# Patient Record
Sex: FEMALE | Race: WHITE | URBAN - METROPOLITAN AREA
[De-identification: names, ages, dates, MRNs, and addresses within clinical notes are randomized per-mention and may not be internally consistent; named-entity substitution may affect disease eponyms.]

---

## 2017-02-01 ENCOUNTER — OFFICE VISIT (OUTPATIENT)
Dept: FAMILY MEDICINE CLINIC | Age: 82
End: 2017-02-01

## 2017-02-01 ENCOUNTER — HOSPITAL ENCOUNTER (OUTPATIENT)
Dept: LAB | Age: 82
Discharge: HOME | End: 2017-02-01
Payer: MEDICARE

## 2017-02-01 VITALS
BODY MASS INDEX: 34.41 KG/M2 | DIASTOLIC BLOOD PRESSURE: 70 MMHG | HEIGHT: 65 IN | TEMPERATURE: 97.7 F | RESPIRATION RATE: 20 BRPM | WEIGHT: 206.5 LBS | SYSTOLIC BLOOD PRESSURE: 150 MMHG | HEART RATE: 57 BPM

## 2017-02-01 DIAGNOSIS — S42.302S: ICD-10-CM

## 2017-02-01 DIAGNOSIS — R32 URINARY INCONTINENCE, UNSPECIFIED TYPE: ICD-10-CM

## 2017-02-01 DIAGNOSIS — E78.5 HYPERLIPIDEMIA, UNSPECIFIED HYPERLIPIDEMIA TYPE: ICD-10-CM

## 2017-02-01 DIAGNOSIS — I89.0 LYMPHEDEMA: ICD-10-CM

## 2017-02-01 DIAGNOSIS — I10 ESSENTIAL HYPERTENSION: ICD-10-CM

## 2017-02-01 DIAGNOSIS — M17.10 ARTHRITIS OF KNEE: ICD-10-CM

## 2017-02-01 DIAGNOSIS — E03.9 HYPOTHYROIDISM, UNSPECIFIED TYPE: ICD-10-CM

## 2017-02-01 DIAGNOSIS — Z51.81 MEDICATION MONITORING ENCOUNTER: ICD-10-CM

## 2017-02-01 DIAGNOSIS — I10 ESSENTIAL HYPERTENSION: Primary | ICD-10-CM

## 2017-02-01 DIAGNOSIS — R73.03 PREDIABETES: ICD-10-CM

## 2017-02-01 DIAGNOSIS — R73.01 ELEVATED FASTING BLOOD SUGAR: ICD-10-CM

## 2017-02-01 LAB
ALBUMIN SERPL BCP-MCNC: 3.5 G/DL (ref 3.4–5)
ALBUMIN/GLOB SERPL: 1.3 {RATIO} (ref 0.8–1.7)
ALP SERPL-CCNC: 86 U/L (ref 45–117)
ALT SERPL-CCNC: 14 U/L (ref 13–56)
ANION GAP BLD CALC-SCNC: 7 MMOL/L (ref 3–18)
AST SERPL W P-5'-P-CCNC: 12 U/L (ref 15–37)
BASOPHILS # BLD AUTO: 0 K/UL (ref 0–0.06)
BASOPHILS # BLD: 0 % (ref 0–2)
BILIRUB SERPL-MCNC: 0.5 MG/DL (ref 0.2–1)
BUN SERPL-MCNC: 26 MG/DL (ref 7–18)
BUN/CREAT SERPL: 21 (ref 12–20)
CALCIUM SERPL-MCNC: 8.9 MG/DL (ref 8.5–10.1)
CHLORIDE SERPL-SCNC: 107 MMOL/L (ref 100–108)
CO2 SERPL-SCNC: 26 MMOL/L (ref 21–32)
CREAT SERPL-MCNC: 1.21 MG/DL (ref 0.6–1.3)
DIFFERENTIAL METHOD BLD: ABNORMAL
EOSINOPHIL # BLD: 0.1 K/UL (ref 0–0.4)
EOSINOPHIL NFR BLD: 1 % (ref 0–5)
ERYTHROCYTE [DISTWIDTH] IN BLOOD BY AUTOMATED COUNT: 14.8 % (ref 11.6–14.5)
GLOBULIN SER CALC-MCNC: 2.8 G/DL (ref 2–4)
GLUCOSE POC: 141 MG/DL
GLUCOSE SERPL-MCNC: 104 MG/DL (ref 74–99)
HBA1C MFR BLD HPLC: 5.8 %
HCT VFR BLD AUTO: 39.8 % (ref 35–45)
HGB BLD-MCNC: 12.5 G/DL (ref 12–16)
LYMPHOCYTES # BLD AUTO: 25 % (ref 21–52)
LYMPHOCYTES # BLD: 1.8 K/UL (ref 0.9–3.6)
MCH RBC QN AUTO: 28 PG (ref 24–34)
MCHC RBC AUTO-ENTMCNC: 31.4 G/DL (ref 31–37)
MCV RBC AUTO: 89.2 FL (ref 74–97)
MONOCYTES # BLD: 0.4 K/UL (ref 0.05–1.2)
MONOCYTES NFR BLD AUTO: 6 % (ref 3–10)
NEUTS SEG # BLD: 5.1 K/UL (ref 1.8–8)
NEUTS SEG NFR BLD AUTO: 68 % (ref 40–73)
PLATELET # BLD AUTO: 219 K/UL (ref 135–420)
PMV BLD AUTO: 11.1 FL (ref 9.2–11.8)
POTASSIUM SERPL-SCNC: 4.6 MMOL/L (ref 3.5–5.5)
PROT SERPL-MCNC: 6.3 G/DL (ref 6.4–8.2)
RBC # BLD AUTO: 4.46 M/UL (ref 4.2–5.3)
SODIUM SERPL-SCNC: 140 MMOL/L (ref 136–145)
T4 FREE SERPL-MCNC: 1.3 NG/DL (ref 0.7–1.5)
TSH SERPL DL<=0.05 MIU/L-ACNC: 2.14 UIU/ML (ref 0.36–3.74)
WBC # BLD AUTO: 7.5 K/UL (ref 4.6–13.2)

## 2017-02-01 PROCEDURE — 36415 COLL VENOUS BLD VENIPUNCTURE: CPT | Performed by: FAMILY MEDICINE

## 2017-02-01 PROCEDURE — 87086 URINE CULTURE/COLONY COUNT: CPT | Performed by: FAMILY MEDICINE

## 2017-02-01 PROCEDURE — 84439 ASSAY OF FREE THYROXINE: CPT | Performed by: FAMILY MEDICINE

## 2017-02-01 PROCEDURE — 80053 COMPREHEN METABOLIC PANEL: CPT | Performed by: FAMILY MEDICINE

## 2017-02-01 PROCEDURE — 85025 COMPLETE CBC W/AUTO DIFF WBC: CPT | Performed by: FAMILY MEDICINE

## 2017-02-01 PROCEDURE — 84443 ASSAY THYROID STIM HORMONE: CPT | Performed by: FAMILY MEDICINE

## 2017-02-01 RX ORDER — LEVOTHYROXINE SODIUM 100 UG/1
TABLET ORAL
COMMUNITY
End: 2017-02-01 | Stop reason: SDUPTHER

## 2017-02-01 RX ORDER — ASPIRIN 81 MG/1
TABLET ORAL DAILY
COMMUNITY
End: 2018-12-13

## 2017-02-01 RX ORDER — LEVOTHYROXINE SODIUM 100 UG/1
100 TABLET ORAL
Qty: 90 TAB | Refills: 1 | Status: SHIPPED | OUTPATIENT
Start: 2017-02-01 | End: 2017-08-01 | Stop reason: SDUPTHER

## 2017-02-01 RX ORDER — LISINOPRIL 40 MG/1
TABLET ORAL
Qty: 90 TAB | Refills: 1 | Status: SHIPPED | OUTPATIENT
Start: 2017-02-01 | End: 2017-07-14 | Stop reason: SDUPTHER

## 2017-02-01 RX ORDER — LEVOTHYROXINE SODIUM 112 UG/1
TABLET ORAL
COMMUNITY
End: 2017-02-01 | Stop reason: CLARIF

## 2017-02-01 NOTE — PROGRESS NOTES
Chief Complaint   Patient presents with    Hypertension    Hypothyroidism    Arthritis    Cholesterol Problem     high chol       Health Maintenance reviewed     1. Have you been to the ER, urgent care clinic since your last visit? Hospitalized since your last visit? Yes When: 8/16 Where: [facility] Reason for visit: fracture, arm    2. Have you seen or consulted any other health care providers outside of the [address] [place] [address] since your last visit? Include any pap smears or colon screening.  Yes When: 8/16 Where: ortho Reason for visit: ov

## 2017-02-01 NOTE — MR AVS SNAPSHOT
Visit Information Date & Time Provider Department Dept. Phone Encounter #  
 2/1/2017  9:15 AM Cleve Benitez MD [facility] [phone] [id number] Follow-up Instructions Return in about 3 months (around 5/1/2017). Your Appointments 5/1/2017  9:30 AM  
Follow Up with Cleve Benitez MD  
[facility] (--) Appt Note: 3 month follow up [place] [id number] [phone]  
  
   
 [place] Mott [id number] Upcoming Health Maintenance Date Due DTaP/Tdap/Td series (1 - Tdap) 1/28/1954 MEDICARE YEARLY EXAM 3/23/2017 Pneumococcal 65+ Low/Medium Risk (2 of 2 - PPSV23) 6/22/2017 GLAUCOMA SCREENING Q2Y 5/21/2018 Allergies as of 2/1/2017  Review Complete On: 2/1/2017 By: Cleve Benitez MD  
  
 Severity Noted Reaction Type Reactions Lipitor [Atorvastatin]  07/29/2010    Other (comments) Leg aches Zocor [Simvastatin]  07/29/2010    Other (comments) Leg aches Current Immunizations  Reviewed on 2/1/2017 No immunizations on file. Reviewed by Cleve Benitez MD on 2/1/2017 at 10:29 AM  
You Were Diagnosed With   
  
 Codes Comments Essential hypertension    -  Primary ICD-10-CM: I10 
ICD-9-CM: 401.9 Elevated fasting blood sugar     ICD-10-CM: R73.01 
ICD-9-CM: 790.21 Prediabetes     ICD-10-CM: R73.03 
ICD-9-CM: 790.29 Hyperlipidemia, unspecified hyperlipidemia type     ICD-10-CM: E78.5 ICD-9-CM: 272.4 Hypothyroidism, unspecified type     ICD-10-CM: E03.9 ICD-9-CM: 244.9 Arthritis of knee     ICD-10-CM: M19.90 ICD-9-CM: 716.96 Lymphedema     ICD-10-CM: I89.0 ICD-9-CM: 708.1 Medication monitoring encounter     ICD-10-CM: Z51.81 
ICD-9-CM: V58.83 Urinary incontinence, unspecified type     ICD-10-CM: R32 
ICD-9-CM: 788.30 Fracture of arm, left, sequela     ICD-10-CM: S42.302S ICD-9-CM: 756. 2 Vitals BP Pulse Temp Resp Height(growth percentile) Weight(growth percentile) 150/70 (BP 1 Location: Right arm, BP Patient Position: Sitting) (!) 57 97.7 °F (36.5 °C) (Oral) 20 5' 5\" (1.651 m) 206 lb 8 oz (93.7 kg) BMI OB Status Smoking Status 34.36 kg/m2 Postmenopausal Never Smoker Vitals History BMI and BSA Data Body Mass Index Body Surface Area  
 34.36 kg/m 2 2.07 m 2 Preferred Pharmacy Pharmacy Name Phone [facility] [address], [address] [facility] Your Updated Medication List  
  
   
This list is accurate as of: 2/1/17 10:47 AM.  Always use your most recent med list. amLODIPine-benazepril 5-20 mg per capsule Commonly known as:  LOTREL  
TAKE ONE CAPSULE BY MOUTH EVERY DAY  
  
 aspirin delayed-release 81 mg tablet Take  by mouth daily. diclofenac 1 % Gel Commonly known as:  VOLTAREN Apply 4 g to affected area four (4) times daily. FISH OIL PO Take  by mouth two (2) times a day. levothyroxine 100 mcg tablet Commonly known as:  SYNTHROID Take 1 Tab by mouth Daily (before breakfast). lisinopril 40 mg tablet Commonly known as:  PRINIVIL, ZESTRIL  
TAKE ONE TABLET BY MOUTH ONCE DAILY PRED FORTE 1 % ophthalmic suspension Generic drug:  prednisoLONE acetate Administer 1 Drop to left eye three (3) times daily. timolol 0.5 % ophthalmic gel-forming Commonly known as:  TIMOPTIC-XE Administer 1 Drop to right eye daily. travoprost 0.004 % ophthalmic solution Commonly known as:  TRAVATAN Z Administer 1 Drop to right eye every evening. Prescriptions Sent to Pharmacy Refills  
 lisinopril (PRINIVIL, ZESTRIL) 40 mg tablet 1 Sig: TAKE ONE TABLET BY MOUTH ONCE DAILY  Class: Normal  
 Pharmacy: [facility] [address] Ph #: [phone]  
 levothyroxine (SYNTHROID) 100 mcg tablet 1  
 Sig: Take 1 Tab by mouth Daily (before breakfast). Class: Normal  
 Pharmacy: [id number] Ph #: [phone] Route: Oral  
  
We Performed the Following AMB POC GLUCOSE, QUANTITATIVE, BLOOD [71972 CPT(R)] AMB POC HEMOGLOBIN A1C [30508 CPT(R)] AMB POC URINALYSIS DIP STICK AUTO W/O MICRO [71157 CPT(R)] REFERRAL TO UROLOGY [[id number] Custom] Comments:  
 Please evaluate for urinary incontinence Follow-up Instructions Return in about 3 months (around 5/1/2017). Referral Information Referral ID Referred By Referred To  
  
 [id number] [place], [address], [place]   
   [place], [address] [address] Phone: [phone] Visits Status Start Date End Date 1 New Request 2/1/17 2/1/18 If your referral has a status of pending review or denied, additional information will be sent to support the outcome of this decision. Introducing [facility]! Dear Yareli Padgett: Thank you for requesting a [employer] account. Our records indicate that you have previously registered for a [employer] account but its currently inactive. Please call our [employer] support line at [phone]. Additional Information If you have questions, please visit the Frequently Asked Questions section of the [employer] website at [email or web address] Remember, [employer] is NOT to be used for urgent needs. For medical emergencies, dial 911. Now available from your iPhone and Android! Please provide this summary of care documentation to your next provider. Your primary care clinician is listed as YASMINE ART. If you have any questions after today's visit, please call [phone].

## 2017-02-01 NOTE — PROGRESS NOTES
HISTORY OF PRESENT ILLNESS  Shell Nash is a 80 y.o. female. Chief Complaint   Patient presents with    Hypertension Chronic problem, uncontrolled today Reports compliance with meds ,Asymptomatic, no headache or dizziness.  Hypothyroidism Chronic problem, control uncertain, labs past due.  Arthritis    Cholesterol Problem     high chol     Multiple issues since last visit, fell while walking on treadmill in 8/20167 and had sling and PT, fracture of the left upper arm. Then has had problems with swelling of the legs, seeing Dr. Blanca Magaña for this treated with compression machine to control  complains of new problem with bladder incontinence noted for a long while worse since the fall last year, worse at night, no urge or other abd pain, f/c no associated sx. HPI  Past Medical History   Diagnosis Date    Arthritis of knee 2/17/2012    CAD (coronary artery disease)     Glaucoma     HTN (hypertension)     Hyperlipidemia     Hypertensive cardiovascular disease     Hypothyroidism     Osteopenia     Prediabetes 3/15/2013     Current Outpatient Prescriptions   Medication Sig Dispense Refill    aspirin delayed-release 81 mg tablet Take  by mouth daily.  levothyroxine (SYNTHROID) 100 mcg tablet Take  by mouth Daily (before breakfast).  lisinopril (PRINIVIL, ZESTRIL) 40 mg tablet TAKE ONE TABLET BY MOUTH ONCE DAILY 90 Tab 0    amLODIPine-benazepril (LOTREL) 5-20 mg per capsule TAKE ONE CAPSULE BY MOUTH EVERY DAY 90 Cap 3    timolol (TIMOPTIC-XR) 0.5 % ophthalmic gel-forming Administer 1 Drop to right eye daily.  travoprost (TRAVATAN) 0.004 % ophthalmic solution Administer 1 Drop to right eye every evening.  DOCOSAHEXANOIC ACID/EPA (FISH OIL PO) Take  by mouth two (2) times a day.  prednisoLONE acetate (PRED FORTE) 1 % ophthalmic suspension Administer 1 Drop to left eye three (3) times daily.       diclofenac (VOLTAREN) 1 % topical gel Apply 4 g to affected area four (4) times daily. 100 g 0     Allergies   Allergen Reactions    Lipitor [Atorvastatin] Other (comments)     Leg aches      Zocor [Simvastatin] Other (comments)     Leg aches         Review of Systems   Constitutional: Negative for chills and fever. Genitourinary: Positive for frequency and urgency. Negative for dysuria, flank pain and hematuria. Visit Vitals    /70 (BP 1 Location: Right arm, BP Patient Position: Sitting)  Comment: manual    Pulse (!) 57    Temp 97.7 °F (36.5 °C) (Oral)    Resp 20    Ht 5' 5\" (1.651 m)    Wt 206 lb 8 oz (93.7 kg)    BMI 34.36 kg/m2       Physical Exam   Constitutional: She appears well-developed and well-nourished. She appears distressed. HENT:   Mouth/Throat: Oropharynx is clear and moist.   Neck: No JVD present. Cardiovascular: Normal rate, regular rhythm and normal heart sounds. Pulmonary/Chest: Effort normal and breath sounds normal. No respiratory distress. She has no wheezes. She has no rales. Musculoskeletal: She exhibits edema (2+). She exhibits no tenderness. Lymphadenopathy:     She has no cervical adenopathy. Neurological: Coordination (ambulates slowly with cane) abnormal.   Psychiatric: She has a normal mood and affect. Her behavior is normal.   Nursing note and vitals reviewed. Results for orders placed or performed in visit on 02/01/17   AMB POC HEMOGLOBIN A1C   Result Value Ref Range    Hemoglobin A1c (POC) 5.8 %   AMB POC GLUCOSE, QUANTITATIVE, BLOOD   Result Value Ref Range    Glucose  mg/dL       ASSESSMENT and PLAN    ICD-10-CM ICD-9-CM    1. Essential hypertension I10 401.9 lisinopril (PRINIVIL, ZESTRIL) 40 mg tablet      METABOLIC PANEL, COMPREHENSIVE      CBC WITH AUTOMATED DIFF   2. Elevated fasting blood sugar R73.01 790.21 AMB POC HEMOGLOBIN A1C      AMB POC GLUCOSE, QUANTITATIVE, BLOOD      METABOLIC PANEL, COMPREHENSIVE      CBC WITH AUTOMATED DIFF   3.  Prediabetes H49.30 000.68 METABOLIC PANEL, COMPREHENSIVE CBC WITH AUTOMATED DIFF   4. Hyperlipidemia, unspecified hyperlipidemia type F29.4 404.4 METABOLIC PANEL, COMPREHENSIVE      CBC WITH AUTOMATED DIFF   5. Hypothyroidism, unspecified type E03.9 244.9 levothyroxine (SYNTHROID) 100 mcg tablet      METABOLIC PANEL, COMPREHENSIVE      CBC WITH AUTOMATED DIFF      TSH 3RD GENERATION      T4, FREE   6. Arthritis of knee M19.90 716.96    7. Lymphedema I89.0 457.1    8. Medication monitoring encounter B58.14 E09.09 METABOLIC PANEL, COMPREHENSIVE      CBC WITH AUTOMATED DIFF      TSH 3RD GENERATION      T4, FREE   9. Urinary incontinence, unspecified type R32 788.30 CULTURE, URINE      AMB POC URINALYSIS DIP STICK AUTO W/O MICRO      METABOLIC PANEL, COMPREHENSIVE      CBC WITH AUTOMATED DIFF      REFERRAL TO UROLOGY   10. Fracture of arm, left, sequela, 8/2016, Dr. Giulia Lopez Z01.391H 037.1    Follow-up Disposition:  Return in about 3 months (around 5/1/2017).

## 2017-02-03 LAB
BACTERIA SPEC CULT: NORMAL
SERVICE CMNT-IMP: NORMAL

## 2017-06-26 RX ORDER — AMLODIPINE AND BENAZEPRIL HYDROCHLORIDE 5; 20 MG/1; MG/1
CAPSULE ORAL
Qty: 90 CAP | Refills: 0 | Status: SHIPPED | OUTPATIENT
Start: 2017-06-26 | End: 2017-09-25 | Stop reason: SDUPTHER

## 2017-07-14 DIAGNOSIS — I10 ESSENTIAL HYPERTENSION: ICD-10-CM

## 2017-07-18 RX ORDER — LISINOPRIL 40 MG/1
TABLET ORAL
Qty: 90 TAB | Refills: 3 | Status: SHIPPED | OUTPATIENT
Start: 2017-07-18 | End: 2018-08-13 | Stop reason: SDUPTHER

## 2017-08-01 ENCOUNTER — HOSPITAL ENCOUNTER (OUTPATIENT)
Dept: LAB | Age: 82
Discharge: HOME | End: 2017-08-01
Payer: MEDICARE

## 2017-08-01 ENCOUNTER — OFFICE VISIT (OUTPATIENT)
Dept: FAMILY MEDICINE CLINIC | Age: 82
End: 2017-08-01

## 2017-08-01 VITALS
SYSTOLIC BLOOD PRESSURE: 144 MMHG | WEIGHT: 212.5 LBS | DIASTOLIC BLOOD PRESSURE: 72 MMHG | HEART RATE: 66 BPM | HEIGHT: 65 IN | BODY MASS INDEX: 35.4 KG/M2 | TEMPERATURE: 97.8 F | RESPIRATION RATE: 16 BRPM

## 2017-08-01 DIAGNOSIS — E03.9 HYPOTHYROIDISM, UNSPECIFIED TYPE: ICD-10-CM

## 2017-08-01 DIAGNOSIS — E78.5 HYPERLIPIDEMIA, UNSPECIFIED HYPERLIPIDEMIA TYPE: ICD-10-CM

## 2017-08-01 DIAGNOSIS — Z12.31 ENCOUNTER FOR SCREENING MAMMOGRAM FOR MALIGNANT NEOPLASM OF BREAST: ICD-10-CM

## 2017-08-01 DIAGNOSIS — I10 ESSENTIAL HYPERTENSION: ICD-10-CM

## 2017-08-01 DIAGNOSIS — Z13.31 SCREENING FOR DEPRESSION: ICD-10-CM

## 2017-08-01 DIAGNOSIS — Z13.39 SCREENING FOR ALCOHOLISM: ICD-10-CM

## 2017-08-01 DIAGNOSIS — Z71.89 ADVANCED DIRECTIVES, COUNSELING/DISCUSSION: ICD-10-CM

## 2017-08-01 DIAGNOSIS — Z00.00 ROUTINE GENERAL MEDICAL EXAMINATION AT A HEALTH CARE FACILITY: Primary | ICD-10-CM

## 2017-08-01 DIAGNOSIS — R73.01 ELEVATED FASTING BLOOD SUGAR: ICD-10-CM

## 2017-08-01 DIAGNOSIS — Z51.81 MEDICATION MONITORING ENCOUNTER: ICD-10-CM

## 2017-08-01 DIAGNOSIS — Z12.11 SCREEN FOR COLON CANCER: ICD-10-CM

## 2017-08-01 DIAGNOSIS — Z71.89 ACP (ADVANCE CARE PLANNING): ICD-10-CM

## 2017-08-01 DIAGNOSIS — R73.03 PREDIABETES: ICD-10-CM

## 2017-08-01 LAB
ALBUMIN SERPL BCP-MCNC: 3.6 G/DL (ref 3.4–5)
ALBUMIN/GLOB SERPL: 1.1 {RATIO} (ref 0.8–1.7)
ALP SERPL-CCNC: 83 U/L (ref 45–117)
ALT SERPL-CCNC: 11 U/L (ref 13–56)
ANION GAP BLD CALC-SCNC: 9 MMOL/L (ref 3–18)
AST SERPL W P-5'-P-CCNC: 11 U/L (ref 15–37)
BASOPHILS # BLD AUTO: 0 K/UL (ref 0–0.06)
BASOPHILS # BLD: 0 % (ref 0–2)
BILIRUB SERPL-MCNC: 0.4 MG/DL (ref 0.2–1)
BUN SERPL-MCNC: 24 MG/DL (ref 7–18)
BUN/CREAT SERPL: 20 (ref 12–20)
CALCIUM SERPL-MCNC: 9.4 MG/DL (ref 8.5–10.1)
CHLORIDE SERPL-SCNC: 109 MMOL/L (ref 100–108)
CO2 SERPL-SCNC: 26 MMOL/L (ref 21–32)
CREAT SERPL-MCNC: 1.2 MG/DL (ref 0.6–1.3)
DIFFERENTIAL METHOD BLD: NORMAL
EOSINOPHIL # BLD: 0.1 K/UL (ref 0–0.4)
EOSINOPHIL NFR BLD: 1 % (ref 0–5)
ERYTHROCYTE [DISTWIDTH] IN BLOOD BY AUTOMATED COUNT: 14.4 % (ref 11.6–14.5)
GLOBULIN SER CALC-MCNC: 3.3 G/DL (ref 2–4)
GLUCOSE SERPL-MCNC: 87 MG/DL (ref 74–99)
HBA1C MFR BLD: 5.8 % (ref 4.2–5.6)
HCT VFR BLD AUTO: 41.5 % (ref 35–45)
HGB BLD-MCNC: 13 G/DL (ref 12–16)
LYMPHOCYTES # BLD AUTO: 27 % (ref 21–52)
LYMPHOCYTES # BLD: 2 K/UL (ref 0.9–3.6)
MCH RBC QN AUTO: 28.2 PG (ref 24–34)
MCHC RBC AUTO-ENTMCNC: 31.3 G/DL (ref 31–37)
MCV RBC AUTO: 90 FL (ref 74–97)
MONOCYTES # BLD: 0.5 K/UL (ref 0.05–1.2)
MONOCYTES NFR BLD AUTO: 6 % (ref 3–10)
NEUTS SEG # BLD: 4.8 K/UL (ref 1.8–8)
NEUTS SEG NFR BLD AUTO: 66 % (ref 40–73)
PLATELET # BLD AUTO: 218 K/UL (ref 135–420)
PMV BLD AUTO: 10.9 FL (ref 9.2–11.8)
POTASSIUM SERPL-SCNC: 5.4 MMOL/L (ref 3.5–5.5)
PROT SERPL-MCNC: 6.9 G/DL (ref 6.4–8.2)
RBC # BLD AUTO: 4.61 M/UL (ref 4.2–5.3)
SODIUM SERPL-SCNC: 144 MMOL/L (ref 136–145)
T4 FREE SERPL-MCNC: 1.4 NG/DL (ref 0.7–1.5)
TSH SERPL DL<=0.05 MIU/L-ACNC: 1.17 UIU/ML (ref 0.36–3.74)
WBC # BLD AUTO: 7.3 K/UL (ref 4.6–13.2)

## 2017-08-01 PROCEDURE — 80053 COMPREHEN METABOLIC PANEL: CPT | Performed by: FAMILY MEDICINE

## 2017-08-01 PROCEDURE — 84439 ASSAY OF FREE THYROXINE: CPT | Performed by: FAMILY MEDICINE

## 2017-08-01 PROCEDURE — 85025 COMPLETE CBC W/AUTO DIFF WBC: CPT | Performed by: FAMILY MEDICINE

## 2017-08-01 PROCEDURE — 83036 HEMOGLOBIN GLYCOSYLATED A1C: CPT | Performed by: FAMILY MEDICINE

## 2017-08-01 PROCEDURE — 36415 COLL VENOUS BLD VENIPUNCTURE: CPT | Performed by: FAMILY MEDICINE

## 2017-08-01 PROCEDURE — 84443 ASSAY THYROID STIM HORMONE: CPT | Performed by: FAMILY MEDICINE

## 2017-08-01 RX ORDER — LEVOTHYROXINE SODIUM 100 UG/1
100 TABLET ORAL
Qty: 90 TAB | Refills: 3 | Status: SHIPPED | OUTPATIENT
Start: 2017-08-01 | End: 2018-07-30 | Stop reason: SDUPTHER

## 2017-08-01 NOTE — PATIENT INSTRUCTIONS
Please contact our office if you have any questions about your visit today. Medicare Part B Preventive Services Limitations Recommendation Scheduled   Bone Mass Measurement  (age 72 & older, biennial) Requires diagnosis related to osteoporosis or estrogen deficiency. Biennial benefit unless patient has history of long-term glucocorticoid tx or baseline is needed because initial test was by other method     Cardiovascular Screening Blood Tests (every 5 years)  Total cholesterol, HDL, Triglycerides Order as a panel if possible     Colorectal Cancer Screening  -Fecal occult blood test (annual)  -Flexible sigmoidoscopy (5y)  -Screening colonoscopy (10y)  -Barium Enema      Counseling to Prevent Tobacco Use (up to 8 sessions per year)  - Counseling greater than 3 and up to 10 minutes  - Counseling greater than 10 minutes Patients must be asymptomatic of tobacco-related conditions to receive as preventive service     Diabetes Screening Tests (at least every 3 years, Medicare covers annually or at 6-month intervals for prediabetic patients)    Fasting blood sugar (FBS) or glucose tolerance test (GTT) Patient must be diagnosed with one of the following:  -Hypertension, Dyslipidemia, obesity, previous impaired FBS or GTT  Or any two of the following: overweight, FH of diabetes, age ? 72, history of gestational diabetes, birth of baby weighing more than 9 pounds     Diabetes Self-Management Training (DSMT) (no USPSTF recommendation) Requires referral by treating physician for patient with diabetes or renal disease. 10 hours of initial DSMT session of no less than 30 minutes each in a continuous 12-month period. 2 hours of follow-up DSMT in subsequent years.      Glaucoma Screening (no USPSTF recommendation) Diabetes mellitus, family history, , age 48 or over,  American, age 72 or over     Human Immunodeficiency Virus (HIV) Screening (annually for increased risk patients)  HIV-1 and HIV-2 by EIA, KG, rapid antibody test, or oral mucosa transudate Patient must be at increased risk for HIV infection per USPSTF guidelines or pregnant. Tests covered annually for patients at increased risk. Pregnant patients may receive up to 3 test during pregnancy. Medical Nutrition Therapy (MNT) (for diabetes or renal disease not recommended schedule) Requires referral by treating physician for patient with diabetes or renal disease. Can be provided in same year as diabetes self-management training (DSMT), and CMS recommends medical nutrition therapy take place after DSMT. Up to 3 hours for initial year and 2 hours in subsequent years. Shingles Vaccination A shingles vaccine is also recommended once in a lifetime after age 61     Seasonal Influenza Vaccination (annually)      Pneumococcal Vaccination (once after 72)      Hepatitis B Vaccinations (if medium/high risk) Medium/high risk factors:  End-stage renal disease,  Hemophiliacs who received Factor VIII or IX concentrates, Clients of institutions for the mentally retarded, Persons who live in the same house as a HepB virus carrier, Homosexual men, Illicit injectable drug abusers. Screening Mammography (biennial age 54-69) Annually (age 36 or over)     Screening Pap Tests and Pelvic Examination (up to age 79 and after 79 if unknown history or abnormal study last 10 years) Every 25 months except high risk     Ultrasound Screening for Abdominal Aortic Aneurysm (AAA) (once) Patient must be referred through [facility] and not have had a screening for abdominal aortic aneurysm before under Medicare.   Limited to patients who meet one of the following criteria:  - Men who are 73-68 years old and have smoked more than 100 cigarettes in their lifetime.  -Anyone with a FH of AAA  -Anyone recommended for screening by USPSTF

## 2017-08-01 NOTE — ACP (ADVANCE CARE PLANNING)
Advance Care Planning      Advance Care Planning    Advance Care Planning (ACP) Provider Note - Comprehensive     Date of ACP Conversation: 08/01/17  Persons included in Conversation:  patient  Length of ACP Conversation in minutes:   17 minutes    Authorized Decision Maker (if patient is incapable of making informed decisions): This person is:  omid          General ACP for ALL Patients with Decision Making Capacity:   Importance of advance care planning, including choosing a healthcare agent to communicate patient's healthcare decisions if patient lost the ability to make decisions, such as after a sudden illness or accident  Understanding of the healthcare agent role was assessed and information provided  Exploration of values, goals, and preferences if recovery is not expected, even with continued medical treatment in the event of: Imminent death  Severe, permanent brain injury  Opportunity offered to explore how cultural, Scientology, spiritual, or personal beliefs would affect decisions for future care     Review of Existing Advance Directive:  none    For Serious or Chronic Illness:  No known illness    Interventions Provided:  Recommended completion of Advance Directive form after review of ACP materials and conversation with prospective healthcare agent   Recommended communicating the plan and making copies for the healthcare agent, personal physician, and others as appropriate (e.g., health system)  Recommended review of completed ACP document annually or upon change in health status Discussed in detail [address] with patient. Patient is undecided on the above, given handout and counseling to review and discuss with son, next of kin. Urged to complete and bring in to scan to chart.

## 2017-08-01 NOTE — PROGRESS NOTES
Chief Complaint   Patient presents with                                          [facility] Visit     Medicare       Health Maintenance Due   Topic Date Due    DTaP/Tdap/Td series (1 - Tdap) 01/28/1954    MEDICARE YEARLY EXAM  03/23/2017    Pneumococcal 65+ Low/Medium Risk (2 of 2 - PPSV23) 06/22/2017    INFLUENZA AGE 9 TO ADULT  08/01/2017       Health Maintenance reviewed    1. Have you been to the ER, urgent care clinic since your last visit? Hospitalized since your last visit? No    2. Have you seen or consulted any other health care providers outside of the [address] [place] [address] since your last visit? Include any pap smears or colon screening. No    This is a Subsequent Medicare Annual Wellness Visit providing Personalized Prevention Plan Services (PPPS) (Performed 12 months after initial AWV and PPPS )    I have reviewed the patient's medical history in detail and updated the computerized patient record. History     Past Medical History:   Diagnosis Date    Arthritis of knee 2/17/2012    CAD (coronary artery disease)     Glaucoma     HTN (hypertension)     Hyperlipidemia     Hypertensive cardiovascular disease     Hypothyroidism     Osteopenia     Prediabetes 3/15/2013      No past surgical history on file. Current Outpatient Prescriptions   Medication Sig Dispense Refill    lisinopril (PRINIVIL, ZESTRIL) 40 mg tablet TAKE ONE TABLET BY MOUTH ONCE DAILY 90 Tab 3    amLODIPine-benazepril (LOTREL) 5-20 mg per capsule TAKE ONE CAPSULE BY MOUTH ONCE DAILY 90 Cap 0    acetaminophen (TYLENOL) 650 mg CR tablet Take 650 mg by mouth every six (6) hours as needed for Pain.  aspirin delayed-release 81 mg tablet Take  by mouth daily.  levothyroxine (SYNTHROID) 100 mcg tablet Take 1 Tab by mouth Daily (before breakfast). 90 Tab 1    diclofenac (VOLTAREN) 1 % topical gel Apply 4 g to affected area four (4) times daily.  100 g 0    timolol (TIMOPTIC-XR) 0.5 % ophthalmic gel-forming Administer 1 Drop to right eye daily.  travoprost (TRAVATAN) 0.004 % ophthalmic solution Administer 1 Drop to right eye every evening.  DOCOSAHEXANOIC ACID/EPA (FISH OIL PO) Take  by mouth two (2) times a day.  prednisoLONE acetate (PRED FORTE) 1 % ophthalmic suspension Administer 1 Drop to left eye three (3) times daily. Allergies   Allergen Reactions    Lipitor [Atorvastatin] Other (comments)     Leg aches      Zocor [Simvastatin] Other (comments)     Leg aches       No family history on file. 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 No    Do you average more than 7 drinks per week? No        Functional Ability and Level of Safety:   No exam data present    Hearing Loss   none    Activities of Daily Living   Self-care. Requires assistance with: no ADLs    Fall Risk   Fall Risk Assessment, last 12 mths 8/1/2017   Able to walk? Yes   Fall in past 12 months? No     Abuse Screen   Patient is not abused    Review of Systems   See additional note    Physical Examination     Evaluation of Cognitive Function:  Mood/affect:  neutral  Appearance: age appropriate  Family member/caregiver input: no    Nursing note and vitals reviewed. Constitutional: She is oriented to person, place, and time.  She appears well-developed and well-nourished. No distress. HENT:   Mouth/Throat: Oropharynx is clear and moist.   Neck: No JVD present. No thyromegaly present. Cardiovascular: Normal rate, regular rhythm and normal heart sounds. Pulmonary/Chest: Effort normal and breath sounds normal. No respiratory distress. She has no wheezes. She has no rales. Musculoskeletal: She exhibits chronic edema. Lymphadenopathy:     She has no cervical adenopathy. Neurological: She is alert and oriented to person, place, and time. Lavada Saucer Psychiatric: She has a normal mood and affect. Her behavior is normal.      Patient Care Team:  Ivanna Goodman MD as PCP - General    Advice/Referrals/Counseling   Education and counseling provided:  Are appropriate based on today's review and evaluation  End-of-Life planning (with patient's consent)  Pneumococcal Vaccine  Influenza Vaccine  Screening Mammography  Screening Pap and pelvic (covered once every 2 years)  Colorectal cancer screening tests  Bone mass measurement (DEXA)      Assessment/Plan       ICD-10-CM ICD-9-CM    1. Routine general medical examination at a health care facility Z00.00 V70.0    2. Screen for colon cancer  Z12.11 V76.51 OCCULT BLOOD, IMMUNOASSAY (FIT)   3. Encounter for screening mammogram for malignant neoplasm of breast, has consistently refused but discussed and ordered today Z12.31 V76.12 FR[facility]IS MAMMO BI SCREENING INCL CAD   4. Screening for depression Z13.89 V79.0 DEPRESSION SCREEN ANNUAL   5. Screening for alcoholism Z13.89 V79.1    6. Advanced directives, counseling/discussion Z71.89 V65.49 ADVANCE CARE PLANNING FIRST 30 MINS   7.  ACP (advance care planning) Z71.89 V65.49 ADVANCE CARE PLANNING FIRST 30 MINS   Refused dexa, pneumococcal thinks she already had

## 2017-08-01 NOTE — MR AVS SNAPSHOT
Visit Information Date & Time Provider Department Dept. Phone Encounter #  
 8/1/2017 10:15 AM Cass Marino MD [address] [phone] [id number] Follow-up Instructions Return in about 4 months (around 12/1/2017). Upcoming Health Maintenance Date Due DTaP/Tdap/Td series (1 - Tdap) 1/28/1954 MEDICARE YEARLY EXAM 3/23/2017 Pneumococcal 65+ Low/Medium Risk (2 of 2 - PPSV23) 6/22/2017 GLAUCOMA SCREENING Q2Y 5/21/2018 Allergies as of 8/1/2017  Review Complete On: 8/1/2017 By: Cass Marino MD  
  
 Severity Noted Reaction Type Reactions Lipitor [Atorvastatin]  07/29/2010    Other (comments) Leg aches Zocor [Simvastatin]  07/29/2010    Other (comments) Leg aches Current Immunizations  Reviewed on 8/1/2017 No immunizations on file. Reviewed by Cass Marino MD on 8/1/2017 at 11:55 AM  
 Reviewed by Cass Marino MD on 8/1/2017 at 11:55 AM  
You Were Diagnosed With   
  
 Codes Comments Routine general medical examination at a health care facility    -  Primary ICD-10-CM: Z00.00 ICD-9-CM: V70.0 Screen for colon cancer     ICD-10-CM: Z12.11 ICD-9-CM: V76.51 Screening for depression     ICD-10-CM: Z13.89 ICD-9-CM: V79.0 Screening for alcoholism     ICD-10-CM: Z13.89 ICD-9-CM: V79.1 Hyperlipidemia, unspecified hyperlipidemia type     ICD-10-CM: E78.5 ICD-9-CM: 272.4 Essential hypertension     ICD-10-CM: I10 
ICD-9-CM: 401.9 Elevated fasting blood sugar     ICD-10-CM: R73.01 
ICD-9-CM: 790.21 Prediabetes     ICD-10-CM: R73.03 
ICD-9-CM: 790.29 ACP (advance care planning)     ICD-10-CM: Z71.89 ICD-9-CM: V65.49 Advanced directives, counseling/discussion     ICD-10-CM: Z71.89 ICD-9-CM: V65.49 Hypothyroidism, unspecified type     ICD-10-CM: E03.9 ICD-9-CM: 244.9  Medication monitoring encounter     ICD-10-CM: Z51.81 
ICD-9-CM: V58.83   
 Encounter for screening mammogram for malignant neoplasm of breast     ICD-10-CM: Z12.31 
ICD-9-CM: V76.12 Vitals OB Status Smoking Status Postmenopausal Never Smoker Preferred Pharmacy Pharmacy Name Phone [facility] [address], [address] [facility] Your Updated Medication List  
  
   
This list is accurate as of: 8/1/17 12:04 PM.  Always use your most recent med list.  
  
  
  
  
 acetaminophen 650 mg CR tablet Commonly known as:  TYLENOL Take 650 mg by mouth every six (6) hours as needed for Pain. amLODIPine-benazepril 5-20 mg per capsule Commonly known as:  LOTREL  
TAKE ONE CAPSULE BY MOUTH ONCE DAILY  
  
 aspirin delayed-release 81 mg tablet Take  by mouth daily. diclofenac 1 % Gel Commonly known as:  VOLTAREN Apply 4 g to affected area four (4) times daily. FISH OIL PO Take  by mouth two (2) times a day. levothyroxine 100 mcg tablet Commonly known as:  SYNTHROID Take 1 Tab by mouth Daily (before breakfast). lisinopril 40 mg tablet Commonly known as:  PRINIVIL, ZESTRIL  
TAKE ONE TABLET BY MOUTH ONCE DAILY PRED FORTE 1 % ophthalmic suspension Generic drug:  prednisoLONE acetate Administer 1 Drop to left eye three (3) times daily. timolol 0.5 % ophthalmic gel-forming Commonly known as:  TIMOPTIC-XE Administer 1 Drop to right eye daily. travoprost 0.004 % ophthalmic solution Commonly known as:  TRAVATAN Z Administer 1 Drop to right eye every evening. Prescriptions Sent to Pharmacy Refills  
 levothyroxine (SYNTHROID) 100 mcg tablet 3 Sig: Take 1 Tab by mouth Daily (before breakfast). Class: Normal  
 Pharmacy: [address] Efrem Serrano [facility]IN Ph #: [phone] Route: Oral  
  
We Performed the Following ADVANCE CARE PLANNING FIRST 30 MINS [19729 CPT(R)] Hyun Hutchinson [[id number]] Follow-up Instructions Return in about 4 months (around 12/1/2017). To-Do List   
 08/01/2017 Lab:  CBC WITH AUTOMATED DIFF   
  
 08/01/2017 Lab:  HEMOGLOBIN A1C W/O EAG   
  
 08/01/2017 Lab:  METABOLIC PANEL, COMPREHENSIVE   
  
 08/01/2017 Lab:  OCCULT BLOOD, IMMUNOASSAY (FIT)   
  
 08/01/2017 Lab:  T4, FREE   
  
 08/01/2017 Lab:  TSH 3RD GENERATION   
  
 08/04/2017 Imaging:  [facility] SCREENING INCL CAD Patient Instructions Please contact our office if you have any questions about your visit today. Medicare Part B Preventive Services Limitations Recommendation Scheduled Bone Mass Measurement 
(age 72 & older, biennial) Requires diagnosis related to osteoporosis or estrogen deficiency. Biennial benefit unless patient has history of long-term glucocorticoid tx or baseline is needed because initial test was by other method Cardiovascular Screening Blood Tests (every 5 years) Total cholesterol, HDL, Triglycerides Order as a panel if possible Colorectal Cancer Screening 
-Fecal occult blood test (annual) -Flexible sigmoidoscopy (5y) 
-Screening colonoscopy (10y) -Barium Enema Counseling to Prevent Tobacco Use (up to 8 sessions per year) - Counseling greater than 3 and up to 10 minutes - Counseling greater than 10 minutes Patients must be asymptomatic of tobacco-related conditions to receive as preventive service Diabetes Screening Tests (at least every 3 years, Medicare covers annually or at 6-month intervals for prediabetic patients) Fasting blood sugar (FBS) or glucose tolerance test (GTT) Patient must be diagnosed with one of the following: 
-Hypertension, Dyslipidemia, obesity, previous impaired FBS or GTT 
Or any two of the following: overweight, FH of diabetes, age ? 72, history of gestational diabetes, birth of baby weighing more than 9 pounds Diabetes Self-Management Training (DSMT) (no USPSTF recommendation) Requires referral by treating physician for patient with diabetes or renal disease. 10 hours of initial DSMT session of no less than 30 minutes each in a continuous 12-month period. 2 hours of follow-up DSMT in subsequent years. Glaucoma Screening (no USPSTF recommendation) Diabetes mellitus, family history, , age 48 or over,  American, age 72 or over Human Immunodeficiency Virus (HIV) Screening (annually for increased risk patients) HIV-1 and HIV-2 by EIA, KG, rapid antibody test, or oral mucosa transudate Patient must be at increased risk for HIV infection per USPSTF guidelines or pregnant. Tests covered annually for patients at increased risk. Pregnant patients may receive up to 3 test during pregnancy. Medical Nutrition Therapy (MNT) (for diabetes or renal disease not recommended schedule) Requires referral by treating physician for patient with diabetes or renal disease. Can be provided in same year as diabetes self-management training (DSMT), and CMS recommends medical nutrition therapy take place after DSMT. Up to 3 hours for initial year and 2 hours in subsequent years. Shingles Vaccination A shingles vaccine is also recommended once in a lifetime after age 61 Seasonal Influenza Vaccination (annually) Pneumococcal Vaccination (once after 65) Hepatitis B Vaccinations (if medium/high risk) Medium/high risk factors:  End-stage renal disease, Hemophiliacs who received Factor VIII or IX concentrates, Clients of institutions for the mentally retarded, Persons who live in the same house as a HepB virus carrier, Homosexual men, Illicit injectable drug abusers. Screening Mammography (biennial age 54-69) Annually (age 36 or over) Screening Pap Tests and Pelvic Examination (up to age 79 and after 79 if unknown history or abnormal study last 10 years) Every 24 months except high risk Ultrasound Screening for Abdominal Aortic Aneurysm (AAA) (once) Patient must be referred through IPPE and not have had a screening for abdominal aortic aneurysm before under Medicare. Limited to patients who meet one of the following criteria: 
- Men who are 73-68 years old and have smoked more than 100 cigarettes in their lifetime. 
-Anyone with a FH of AAA 
-Anyone recommended for screening by USPSTF Introducing [facility] [place] [facility]! Dear Uriel Wilcox: Thank you for requesting a [employer] account. Our records indicate that you have previously registered for a [employer]hentic[employer] account but its currently inactive. Please call our Aut[employer] support line at [phone]. Additional Information If you have questions, please visit the Frequently Asked Questions section of the [employer] website at [email or web address] Remember, [employer] is NOT to be used for urgent needs. For medical emergencies, dial 911. Now available from your iPhone and Android! Please provide this summary of care documentation to your next provider. Your primary care clinician is listed as YASMINE ART. If you have any questions after today's visit, please call [phone].

## 2017-08-01 NOTE — PROGRESS NOTES
HISTORY OF PRESENT ILLNESS  Shell Chávez is a 80 y.o. female. Chief Complaint   Patient presents with    Hypertension chronic problem, stable Reports compliance with meds Asymptomatic, no headache or dizziness.  Blood sugar problem     elevated fasting blood glucose chronic problem, stable no meds on diet for this     Cholesterol Problem Chronic problem, uncontrolled refused med for this     high chol    Hypothyroidism chronic problem, stable asymptomatic      Arthritis    Other     Lymphedema chronic problem, stable                 HPI  Past Medical History:   Diagnosis Date    Arthritis of knee 2/17/2012    CAD (coronary artery disease)     Glaucoma     HTN (hypertension)     Hyperlipidemia     Hypertensive cardiovascular disease     Hypothyroidism     Osteopenia     Prediabetes 3/15/2013     Current Outpatient Prescriptions   Medication Sig Dispense Refill    lisinopril (PRINIVIL, ZESTRIL) 40 mg tablet TAKE ONE TABLET BY MOUTH ONCE DAILY 90 Tab 3    amLODIPine-benazepril (LOTREL) 5-20 mg per capsule TAKE ONE CAPSULE BY MOUTH ONCE DAILY 90 Cap 0    acetaminophen (TYLENOL) 650 mg CR tablet Take 650 mg by mouth every six (6) hours as needed for Pain.  aspirin delayed-release 81 mg tablet Take  by mouth daily.  levothyroxine (SYNTHROID) 100 mcg tablet Take 1 Tab by mouth Daily (before breakfast). 90 Tab 1    diclofenac (VOLTAREN) 1 % topical gel Apply 4 g to affected area four (4) times daily. 100 g 0    timolol (TIMOPTIC-XR) 0.5 % ophthalmic gel-forming Administer 1 Drop to right eye daily.  travoprost (TRAVATAN) 0.004 % ophthalmic solution Administer 1 Drop to right eye every evening.  DOCOSAHEXANOIC ACID/EPA (FISH OIL PO) Take  by mouth two (2) times a day.  prednisoLONE acetate (PRED FORTE) 1 % ophthalmic suspension Administer 1 Drop to left eye three (3) times daily.        Allergies   Allergen Reactions    Lipitor [Atorvastatin] Other (comments)     Leg aches      Zocor [Simvastatin] Other (comments)     Leg aches         ROS Respiratory: Negative for shortness of breath. Cardiovascular: Negative for chest pain. Genitourinary: Negative for frequency. Neurological: Negative for dizziness and headaches. Visit Vitals    /72 (BP 1 Location: Left arm, BP Patient Position: Sitting)  Comment: manual    Pulse 66    Temp 97.8 °F (36.6 °C) (Oral)    Resp 16    Ht 5' 5\" (1.651 m)    Wt 212 lb 8 oz (96.4 kg)    BMI 35.36 kg/m2       Physical Exam  Nursing note and vitals reviewed. Constitutional: She is oriented to person, place, and time. She appears well-developed and well-nourished. No distress. HENT:   Mouth/Throat: Oropharynx is clear and moist.   Neck: No JVD present. No thyromegaly present. Cardiovascular: Normal rate, regular rhythm and normal heart sounds. Pulmonary/Chest: Effort normal and breath sounds normal. No respiratory distress. She has no wheezes. She has no rales. Musculoskeletal: She exhibits 1+ chronic edema. Lymphadenopathy:     She has no cervical adenopathy. Neurological: She is alert and oriented to person, place, and time. Coordination abnormal. ambulates slowly with cane  Psychiatric: She has a normal mood and affect. Her behavior is normal.    ASSESSMENT and PLAN    ICD-10-CM ICD-9-CM    8. Essential hypertension stable continue current medications  A73 145.7 METABOLIC PANEL, COMPREHENSIVE      CBC WITH AUTOMATED DIFF   9. Hyperlipidemia, unspecified uncontrolled refused lab aas does not wants meds  E78.5 272.4    10. Elevated fasting blood sugar Labs drawn in office today    C34.52 898.74 METABOLIC PANEL, COMPREHENSIVE      CBC WITH AUTOMATED DIFF      HEMOGLOBIN A1C W/O EAG   11. Prediabetes R73.03 790.29    12.  Hypothyroidism, unspecified type Labs drawn in office today    E03.9 244.9 levothyroxine (SYNTHROID) 100 mcg tablet      METABOLIC PANEL, COMPREHENSIVE      TSH 3RD GENERATION      T4, FREE      CBC WITH AUTOMATED DIFF   13. Medication monitoring encounter O32.43 Z56.65 METABOLIC PANEL, COMPREHENSIVE      TSH 3RD GENERATION      T4, FREE      CBC WITH AUTOMATED DIFF      HEMOGLOBIN A1C W/O EAG   Follow-up Disposition:  Return in about 4 months (around 12/1/2017).

## 2017-09-25 RX ORDER — AMLODIPINE AND BENAZEPRIL HYDROCHLORIDE 5; 20 MG/1; MG/1
CAPSULE ORAL
Qty: 90 CAP | Refills: 0 | Status: SHIPPED | OUTPATIENT
Start: 2017-09-25 | End: 2017-12-19 | Stop reason: SDUPTHER

## 2017-12-04 ENCOUNTER — OFFICE VISIT (OUTPATIENT)
Dept: FAMILY MEDICINE CLINIC | Age: 82
End: 2017-12-04

## 2017-12-04 VITALS
BODY MASS INDEX: 34.86 KG/M2 | HEIGHT: 65 IN | HEART RATE: 58 BPM | RESPIRATION RATE: 20 BRPM | SYSTOLIC BLOOD PRESSURE: 138 MMHG | WEIGHT: 209.25 LBS | DIASTOLIC BLOOD PRESSURE: 70 MMHG | TEMPERATURE: 97.3 F

## 2017-12-04 DIAGNOSIS — I10 ESSENTIAL HYPERTENSION: Primary | ICD-10-CM

## 2017-12-04 DIAGNOSIS — M17.10 ARTHRITIS OF KNEE: ICD-10-CM

## 2017-12-04 DIAGNOSIS — R60.9 CHRONIC EDEMA: ICD-10-CM

## 2017-12-04 DIAGNOSIS — R73.01 ELEVATED FASTING BLOOD SUGAR: ICD-10-CM

## 2017-12-04 DIAGNOSIS — E78.5 HYPERLIPIDEMIA, UNSPECIFIED HYPERLIPIDEMIA TYPE: ICD-10-CM

## 2017-12-04 DIAGNOSIS — Z51.81 MEDICATION MONITORING ENCOUNTER: ICD-10-CM

## 2017-12-04 DIAGNOSIS — E03.9 HYPOTHYROIDISM, UNSPECIFIED TYPE: ICD-10-CM

## 2017-12-04 NOTE — PROGRESS NOTES
Chief Complaint   Patient presents with    Hypertension    Cholesterol Problem     high chol    Blood sugar problem     elevated fasting blood glucose    Hypothyroidism       Health Maintenance Due   Topic Date Due    DTaP/Tdap/Td series (1 - Tdap) 01/28/1954    Pneumococcal 65+ Low/Medium Risk (2 of 2 - PPSV23) 06/22/2017       Health Maintenance reviewed     1. Have you been to the ER, urgent care clinic since your last visit? Hospitalized since your last visit? No    2. Have you seen or consulted any other health care providers outside of the [address] [place] [address] since your last visit? Include any pap smears or colon screening.  No    Per Verbal order from Joanna Benítez MD   to remove Voltaren gel from the patients medication list.

## 2017-12-04 NOTE — MR AVS SNAPSHOT
Visit Information Date & Time Provider Department Dept. Phone Encounter #  
 12/4/2017  9:30 AM Leighton Gallagher MD [address] [phone] [id number] Follow-up Instructions Return in about 4 months (around 4/4/2018). Upcoming Health Maintenance Date Due DTaP/Tdap/Td series (1 - Tdap) 1/28/1954 Pneumococcal 65+ Low/Medium Risk (2 of 2 - PPSV23) 6/22/2017 GLAUCOMA SCREENING Q2Y 5/21/2018 MEDICARE YEARLY EXAM 8/2/2018 Allergies as of 12/4/2017  Review Complete On: 12/4/2017 By: Leighton Gallagher MD  
  
 Severity Noted Reaction Type Reactions Lipitor [Atorvastatin]  07/29/2010    Other (comments) Leg aches Zocor [Simvastatin]  07/29/2010    Other (comments) Leg aches Current Immunizations  Reviewed on 8/1/2017 No immunizations on file. Not reviewed this visit You Were Diagnosed With   
  
 Codes Comments Essential hypertension    -  Primary ICD-10-CM: I10 
ICD-9-CM: 401.9 Elevated fasting blood sugar     ICD-10-CM: R73.01 
ICD-9-CM: 790.21 Hypothyroidism, unspecified type     ICD-10-CM: E03.9 ICD-9-CM: 244.9 Hyperlipidemia, unspecified hyperlipidemia type     ICD-10-CM: E78.5 ICD-9-CM: 272.4 Arthritis of knee     ICD-10-CM: M17.10 ICD-9-CM: 716.96 Chronic edema     ICD-10-CM: R60.9 ICD-9-CM: 735. 3 Medication monitoring encounter     ICD-10-CM: Z51.81 
ICD-9-CM: V58.83 Vitals BP Pulse Temp Resp Height(growth percentile) Weight(growth percentile) 138/70 (BP 1 Location: Left arm, BP Patient Position: Sitting) (!) 58 97.3 °F (36.3 °C) (Oral) 20 5' 5\" (1.651 m) 209 lb 4 oz (94.9 kg) BMI OB Status Smoking Status 34.82 kg/m2 Postmenopausal Never Smoker Vitals History BMI and BSA Data Body Mass Index Body Surface Area 34.82 kg/m 2 2.09 m 2 Preferred Pharmacy Pharmacy Name Phone [facility] [address], [address] [facility] Your Updated Medication List  
  
   
This list is accurate as of: 12/4/17 10:23 AM.  Always use your most recent med list.  
  
  
  
  
 acetaminophen 650 mg Tber Commonly known as:  TYLENOL Take 650 mg by mouth every six (6) hours as needed for Pain. amLODIPine-benazepril 5-20 mg per capsule Commonly known as:  LOTREL  
TAKE ONE CAPSULE BY MOUTH ONCE DAILY  
  
 aspirin delayed-release 81 mg tablet Take  by mouth daily. FISH OIL PO Take  by mouth two (2) times a day. levothyroxine 100 mcg tablet Commonly known as:  SYNTHROID Take 1 Tab by mouth Daily (before breakfast). lisinopril 40 mg tablet Commonly known as:  PRINIVIL, ZESTRIL  
TAKE ONE TABLET BY MOUTH ONCE DAILY PRED FORTE 1 % ophthalmic suspension Generic drug:  prednisoLONE acetate Administer 1 Drop to left eye three (3) times daily. timolol 0.5 % ophthalmic gel-forming Commonly known as:  TIMOPTIC-XE Administer 1 Drop to right eye daily. travoprost 0.004 % ophthalmic solution Commonly known as:  TRAVATAN Z Administer 1 Drop to right eye every evening. Follow-up Instructions Return in about 4 months (around 4/4/2018). To-Do List   
 03/04/2018 Lab:  HEMOGLOBIN A1C W/O EAG   
  
 03/04/2018 Lab:  LIPID PANEL   
  
 03/04/2018 Lab:  METABOLIC PANEL, COMPREHENSIVE   
  
 03/04/2018 Lab:  T4, FREE   
  
 03/04/2018 Lab:  TSH 3RD GENERATION Patient Instructions Please contact our office if you have any questions about your visit today. Introducing [facility] [place] & [facility]! Dear Lasandra Dance: Thank you for requesting a [employer] account. Our records indicate that you have previously registered for a [employer] account but its currently inactive. Please call our [employer] support line at [phone]. Additional Information If you have questions, please visit the Frequently Asked Questions section of the [employer] website at [email or web address] Remember, [employer] is NOT to be used for urgent needs. For medical emergencies, dial 911. Now available from your iPhone and Android! Please provide this summary of care documentation to your next provider. Your primary care clinician is listed as YASMINE ART. If you have any questions after today's visit, please call [phone].

## 2017-12-04 NOTE — PATIENT INSTRUCTIONS
Please contact our office if you have any questions about your visit today. Body Mass Index: Care Instructions  Your Care Instructions    Body mass index (BMI) can help you see if your weight is raising your risk for health problems. It uses a formula to compare how much you weigh with how tall you are. · A BMI lower than 18.5 is considered underweight. · A BMI between 18.5 and 24.9 is considered healthy. · A BMI between 25 and 29.9 is considered overweight. A BMI of 30 or higher is considered obese. If your BMI is in the normal range, it means that you have a lower risk for weight-related health problems. If your BMI is in the overweight or obese range, you may be at increased risk for weight-related health problems, such as high blood pressure, heart disease, stroke, arthritis or joint pain, and diabetes. If your BMI is in the underweight range, you may be at increased risk for health problems such as fatigue, lower protection (immunity) against illness, muscle loss, bone loss, hair loss, and hormone problems. BMI is just one measure of your risk for weight-related health problems. You may be at higher risk for health problems if you are not active, you eat an unhealthy diet, or you drink too much alcohol or use tobacco products. Follow-up care is a key part of your treatment and safety. Be sure to make and go to all appointments, and call your doctor if you are having problems. It's also a good idea to know your test results and keep a list of the medicines you take. How can you care for yourself at home? · Practice healthy eating habits. This includes eating plenty of fruits, vegetables, whole grains, lean protein, and low-fat dairy. · If your doctor recommends it, get more exercise. Walking is a good choice. Bit by bit, increase the amount you walk every day. Try for at least 30 minutes on most days of the week. · Do not smoke. Smoking can increase your risk for health problems.  If you need help quitting, talk to your doctor about stop-smoking programs and medicines. These can increase your chances of quitting for good. · Limit alcohol to 2 drinks a day for men and 1 drink a day for women. Too much alcohol can cause health problems. If you have a BMI higher than 25  · Your doctor may do other tests to check your risk for weight-related health problems. This may include measuring the distance around your waist. A waist measurement of more than 40 inches in men or 35 inches in women can increase the risk of weight-related health problems. · Talk with your doctor about steps you can take to stay healthy or improve your health. You may need to make lifestyle changes to lose weight and stay healthy, such as changing your diet and getting regular exercise. If you have a BMI lower than 18.5  · Your doctor may do other tests to check your risk for health problems. · Talk with your doctor about steps you can take to stay healthy or improve your health. You may need to make lifestyle changes to gain or maintain weight and stay healthy, such as getting more healthy foods in your diet and doing exercises to build muscle. Where can you learn more? Go to http://mila-jamie.info/. Enter S176 in the search box to learn more about \"Body Mass Index: Care Instructions. \"  Current as of: October 13, 2016  Content Version: 11.4  © 4245-4814 Healthwise, [employer]. Care instructions adapted under license by [employer] (which disclaims liability or warranty for this information). If you have questions about a medical condition or this instruction, always ask your healthcare professional. [place] [address] any warranty or liability for your use of this information.

## 2017-12-04 NOTE — PROGRESS NOTES
HISTORY OF PRESENT ILLNESS  Radha Pulliam is a 80 y.o. female.,  Chief Complaint   Patient presents with    Hypertension chronic problem, stable     Cholesterol Problem Chronic problem, uncontrolled no meds     high chol    Blood sugar problem     elevated fasting blood glucose chronic problem, stable no meds on diet for this     Hypothyroidism chronic problem, stable     complains of ongoing chronic pain left knee, sees ortho, ambulating with walker  HPI  Past Medical History:   Diagnosis Date    Arthritis of knee 2/17/2012    CAD (coronary artery disease)     Glaucoma     HTN (hypertension)     Hyperlipidemia     Hypertensive cardiovascular disease     Hypothyroidism     Osteopenia     Prediabetes 3/15/2013     Current Outpatient Prescriptions   Medication Sig Dispense Refill    amLODIPine-benazepril (LOTREL) 5-20 mg per capsule TAKE ONE CAPSULE BY MOUTH ONCE DAILY 90 Cap 0    levothyroxine (SYNTHROID) 100 mcg tablet Take 1 Tab by mouth Daily (before breakfast). (Patient taking differently: Take 112 mcg by mouth Daily (before breakfast). ) 90 Tab 3    lisinopril (PRINIVIL, ZESTRIL) 40 mg tablet TAKE ONE TABLET BY MOUTH ONCE DAILY 90 Tab 3    acetaminophen (TYLENOL) 650 mg CR tablet Take 650 mg by mouth every six (6) hours as needed for Pain.  aspirin delayed-release 81 mg tablet Take  by mouth daily.  timolol (TIMOPTIC-XR) 0.5 % ophthalmic gel-forming Administer 1 Drop to right eye daily.  travoprost (TRAVATAN) 0.004 % ophthalmic solution Administer 1 Drop to right eye every evening.  DOCOSAHEXANOIC ACID/EPA (FISH OIL PO) Take  by mouth two (2) times a day.  prednisoLONE acetate (PRED FORTE) 1 % ophthalmic suspension Administer 1 Drop to left eye three (3) times daily.        Allergies   Allergen Reactions    Lipitor [Atorvastatin] Other (comments)     Leg aches      Zocor [Simvastatin] Other (comments)     Leg aches         Review of Systems   Respiratory: Negative for shortness of breath. Cardiovascular: Negative for chest pain. Musculoskeletal: Positive for joint pain (knee). Negative for falls. [facility] Respiratory: Negative for shortness of breath. Cardiovascular: Negative for chest pain. Genitourinary: Negative for frequency. Neurological: Negative for dizziness and headaches. Visit Vitals    /70 (BP 1 Location: Left arm, BP Patient Position: Sitting)  Comment: manual    Pulse (!) 58    Temp 97.3 °F (36.3 °C) (Oral)    Resp 20    Ht 5' 5\" (1.651 m)    Wt 209 lb 4 oz (94.9 kg)    BMI 34.82 kg/m2       Physical Exam   Constitutional: She is oriented to person, place, and time. She appears well-developed and well-nourished. No distress. HENT:   Mouth/Throat: Oropharynx is clear and moist.   Neck: No tracheal deviation present. No thyromegaly present. Cardiovascular: Normal rate, regular rhythm and normal heart sounds. Pulmonary/Chest: Effort normal and breath sounds normal. No respiratory distress. She has no wheezes. She has no rales. Musculoskeletal: She exhibits edema (chronic'). She exhibits no tenderness. Lymphadenopathy:     She has no cervical adenopathy. Neurological: She is alert and oriented to person, place, and time. Coordination (slowly with walker) abnormal.   Skin: No pallor. Nursing note and vitals reviewed. ASSESSMENT and PLAN    ICD-10-CM ICD-9-CM    1. Essential hypertension Q96 495.3 METABOLIC PANEL, COMPREHENSIVE   2. Elevated fasting blood sugar N85.22 372.22 METABOLIC PANEL, COMPREHENSIVE      HEMOGLOBIN A1C W/O EAG   3. Hypothyroidism, unspecified type K32.2 536.6 METABOLIC PANEL, COMPREHENSIVE      TSH 3RD GENERATION      T4, FREE   4. Hyperlipidemia, unspecified hyperlipidemia type E78.5 272.4 LIPID PANEL      METABOLIC PANEL, COMPREHENSIVE   5.  Medication monitoring encounter Z51.81 V58.83 LIPID PANEL      METABOLIC PANEL, COMPREHENSIVE      TSH 3RD GENERATION      T4, FREE       Discussed the patient's BMI with her. The BMI follow up plan is as follows:     dietary management education, guidance, and counseling  encourage exercise  monitor weight  prescribed dietary intake    An After Visit Summary was printed and given to the patient.

## 2017-12-21 NOTE — TELEPHONE ENCOUNTER
Pt called requesting refill for medication . Requested Prescriptions     Pending Prescriptions Disp Refills    amLODIPine-benazepril (LOTREL) 5-20 mg per capsule [Pharmacy Med Name: AMLOD/BENAZEPRIL 5-20MG  CAP] 90 Cap 0     Sig: TAKE ONE CAPSULE BY MOUTH ONCE DAILY     Pt is out of medication. Gi advise.

## 2017-12-22 RX ORDER — AMLODIPINE AND BENAZEPRIL HYDROCHLORIDE 5; 20 MG/1; MG/1
CAPSULE ORAL
Qty: 30 CAP | Refills: 0 | Status: SHIPPED | OUTPATIENT
Start: 2017-12-22 | End: 2018-01-23 | Stop reason: SDUPTHER

## 2018-01-23 NOTE — TELEPHONE ENCOUNTER
Pt needs a refill on     Requested Prescriptions     Pending Prescriptions Disp Refills    amLODIPine-benazepril (LOTREL) 5-20 mg per capsule 30 Cap 0     Pt wants 3 month supply

## 2018-01-27 RX ORDER — AMLODIPINE AND BENAZEPRIL HYDROCHLORIDE 5; 20 MG/1; MG/1
CAPSULE ORAL
Qty: 30 CAP | Refills: 1 | Status: SHIPPED | OUTPATIENT
Start: 2018-01-27 | End: 2018-03-29 | Stop reason: SDUPTHER

## 2018-03-29 NOTE — TELEPHONE ENCOUNTER
Requested Prescriptions     Pending Prescriptions Disp Refills    amLODIPine-benazepril (LOTREL) 5-20 mg per capsule 30 Cap 1     Sig: TAKE ONE CAPSULE BY MOUTH ONCE DAILY

## 2018-03-30 RX ORDER — AMLODIPINE AND BENAZEPRIL HYDROCHLORIDE 5; 20 MG/1; MG/1
CAPSULE ORAL
Qty: 30 CAP | Refills: 1 | Status: SHIPPED | OUTPATIENT
Start: 2018-03-30 | End: 2018-06-25 | Stop reason: SDUPTHER

## 2018-04-10 ENCOUNTER — HOSPITAL ENCOUNTER (OUTPATIENT)
Dept: LAB | Age: 83
Discharge: HOME | End: 2018-04-10
Payer: MEDICARE

## 2018-04-10 DIAGNOSIS — Z51.81 MEDICATION MONITORING ENCOUNTER: ICD-10-CM

## 2018-04-10 DIAGNOSIS — E78.5 HYPERLIPIDEMIA, UNSPECIFIED HYPERLIPIDEMIA TYPE: ICD-10-CM

## 2018-04-10 DIAGNOSIS — I10 ESSENTIAL HYPERTENSION: ICD-10-CM

## 2018-04-10 DIAGNOSIS — E03.9 HYPOTHYROIDISM, UNSPECIFIED TYPE: ICD-10-CM

## 2018-04-10 DIAGNOSIS — R73.01 ELEVATED FASTING BLOOD SUGAR: ICD-10-CM

## 2018-04-10 LAB
ALBUMIN SERPL-MCNC: 3.5 G/DL (ref 3.4–5)
ALBUMIN/GLOB SERPL: 1.2 {RATIO} (ref 0.8–1.7)
ALP SERPL-CCNC: 75 U/L (ref 45–117)
ALT SERPL-CCNC: 13 U/L (ref 13–56)
ANION GAP SERPL CALC-SCNC: 9 MMOL/L (ref 3–18)
AST SERPL-CCNC: 12 U/L (ref 15–37)
BILIRUB SERPL-MCNC: 0.5 MG/DL (ref 0.2–1)
BUN SERPL-MCNC: 21 MG/DL (ref 7–18)
BUN/CREAT SERPL: 18 (ref 12–20)
CALCIUM SERPL-MCNC: 9.1 MG/DL (ref 8.5–10.1)
CHLORIDE SERPL-SCNC: 111 MMOL/L (ref 100–108)
CHOLEST SERPL-MCNC: 269 MG/DL
CO2 SERPL-SCNC: 25 MMOL/L (ref 21–32)
CREAT SERPL-MCNC: 1.2 MG/DL (ref 0.6–1.3)
GLOBULIN SER CALC-MCNC: 3 G/DL (ref 2–4)
GLUCOSE SERPL-MCNC: 109 MG/DL (ref 74–99)
HBA1C MFR BLD: 6.4 % (ref 4.2–5.6)
HDLC SERPL-MCNC: 61 MG/DL (ref 40–60)
HDLC SERPL: 4.4 {RATIO} (ref 0–5)
LDLC SERPL CALC-MCNC: 186.6 MG/DL (ref 0–100)
LIPID PROFILE,FLP: ABNORMAL
POTASSIUM SERPL-SCNC: 5.4 MMOL/L (ref 3.5–5.5)
PROT SERPL-MCNC: 6.5 G/DL (ref 6.4–8.2)
SODIUM SERPL-SCNC: 145 MMOL/L (ref 136–145)
T4 FREE SERPL-MCNC: 1.1 NG/DL (ref 0.7–1.5)
TRIGL SERPL-MCNC: 107 MG/DL (ref ?–150)
TSH SERPL DL<=0.05 MIU/L-ACNC: 3.1 UIU/ML (ref 0.36–3.74)
VLDLC SERPL CALC-MCNC: 21.4 MG/DL

## 2018-04-10 PROCEDURE — 80061 LIPID PANEL: CPT | Performed by: FAMILY MEDICINE

## 2018-04-10 PROCEDURE — 83036 HEMOGLOBIN GLYCOSYLATED A1C: CPT | Performed by: FAMILY MEDICINE

## 2018-04-10 PROCEDURE — 36415 COLL VENOUS BLD VENIPUNCTURE: CPT | Performed by: FAMILY MEDICINE

## 2018-04-10 PROCEDURE — 80053 COMPREHEN METABOLIC PANEL: CPT | Performed by: FAMILY MEDICINE

## 2018-04-10 PROCEDURE — 84443 ASSAY THYROID STIM HORMONE: CPT | Performed by: FAMILY MEDICINE

## 2018-04-10 PROCEDURE — 84439 ASSAY OF FREE THYROXINE: CPT | Performed by: FAMILY MEDICINE

## 2018-04-17 ENCOUNTER — OFFICE VISIT (OUTPATIENT)
Dept: FAMILY MEDICINE CLINIC | Age: 83
End: 2018-04-17

## 2018-04-17 VITALS
DIASTOLIC BLOOD PRESSURE: 80 MMHG | BODY MASS INDEX: 31.16 KG/M2 | OXYGEN SATURATION: 99 % | HEART RATE: 64 BPM | RESPIRATION RATE: 14 BRPM | SYSTOLIC BLOOD PRESSURE: 138 MMHG | HEIGHT: 65 IN | TEMPERATURE: 98.1 F | WEIGHT: 187 LBS

## 2018-04-17 DIAGNOSIS — I10 ESSENTIAL HYPERTENSION: Primary | ICD-10-CM

## 2018-04-17 DIAGNOSIS — E03.9 HYPOTHYROIDISM, UNSPECIFIED TYPE: ICD-10-CM

## 2018-04-17 DIAGNOSIS — M17.10 ARTHRITIS OF KNEE: ICD-10-CM

## 2018-04-17 DIAGNOSIS — E78.5 HYPERLIPIDEMIA, UNSPECIFIED HYPERLIPIDEMIA TYPE: ICD-10-CM

## 2018-04-17 DIAGNOSIS — R73.01 ELEVATED FASTING BLOOD SUGAR: ICD-10-CM

## 2018-04-17 RX ORDER — DICLOFENAC SODIUM 10 MG/G
4 GEL TOPICAL 4 TIMES DAILY
Qty: 100 G | Refills: 0 | Status: SHIPPED | OUTPATIENT
Start: 2018-04-17 | End: 2018-07-17 | Stop reason: SDUPTHER

## 2018-04-17 NOTE — PROGRESS NOTES
HPI  Sajan Alatorre is a 80 y.o. female  Chief Complaint   Patient presents with    Follow-up     Patient last seen in office 12/04/2017    Medication Refill     Patient would like a prescription for Voltaren Gel 1%. Last prescription was written 9/14/2015     Reports she is not on cholesterol medications and she does not want to be on any medications for her cholesterol. Hypertension - reports she has refills and she has been on her blood pressure medications for a long time with no problem. Declines any changes to her medication as they have been working for her. Reports she takes her thyroid medication as prescribed. Denies any issues. Reports using voltaren gel on her knees for her arthritis. Denies pain on today's visit. Past Medical History  Past Medical History:   Diagnosis Date    Arthritis of knee 2/17/2012    CAD (coronary artery disease)     Glaucoma     HTN (hypertension)     Hyperlipidemia     Hypertensive cardiovascular disease     Hypothyroidism     Osteopenia     Prediabetes 3/15/2013       Surgical History  No past surgical history on file. Medications  Current Outpatient Prescriptions   Medication Sig Dispense Refill    diclofenac (VOLTAREN) 1 % gel Apply 4 g to affected area four (4) times daily. 100 g 0    amLODIPine-benazepril (LOTREL) 5-20 mg per capsule TAKE ONE CAPSULE BY MOUTH ONCE DAILY 30 Cap 1    levothyroxine (SYNTHROID) 100 mcg tablet Take 1 Tab by mouth Daily (before breakfast). (Patient taking differently: Take 112 mcg by mouth Daily (before breakfast). ) 90 Tab 3    lisinopril (PRINIVIL, ZESTRIL) 40 mg tablet TAKE ONE TABLET BY MOUTH ONCE DAILY 90 Tab 3    acetaminophen (TYLENOL) 650 mg CR tablet Take 650 mg by mouth every six (6) hours as needed for Pain.  timolol (TIMOPTIC-XR) 0.5 % ophthalmic gel-forming Administer 1 Drop to right eye daily.  travoprost (TRAVATAN) 0.004 % ophthalmic solution Administer 1 Drop to right eye every evening.  DOCOSAHEXANOIC ACID/EPA (FISH OIL PO) Take  by mouth two (2) times a day.  prednisoLONE acetate (PRED FORTE) 1 % ophthalmic suspension Administer 1 Drop to left eye three (3) times daily.  aspirin delayed-release 81 mg tablet Take  by mouth daily. Allergies  Allergies   Allergen Reactions    Lipitor [Atorvastatin] Other (comments)     Leg aches      Zocor [Simvastatin] Other (comments)     Leg aches         Family History  No family history on file. Social History  Social History     Social History    Marital status:      Spouse name: N/A    Number of children: N/A    Years of education: N/A     Occupational History    Not on file. 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 Respiratory: Negative for shortness of breath. Cardiovascular: Negative for chest pain and palpitations. Gastrointestinal: Negative for nausea and vomiting. Musculoskeletal: Positive for joint pain. Negative for back pain and falls. Neurological: Negative for dizziness. Vital Signs  Vitals:    04/17/18 1355   BP: 138/80   Pulse: 64   Resp: 14   Temp: 98.1 °F (36.7 °C)   TempSrc: Oral   SpO2: 99%   Weight: 187 lb (84.8 kg)   Height: 5' 5\" (1.651 m)   PainSc:   0 - No pain       Physical Exam  Physical Exam   Constitutional: She is oriented to person, place, and time.    HENT:   Mouth/Throat: Oropharynx is clear and moist.   Eyes: Left lid drooping and covering pupil. Right eye reactive to light. Cardiovascular: Normal rate, regular rhythm, normal heart sounds and intact distal pulses. Pulmonary/Chest: Effort normal and breath sounds normal. No respiratory distress. Musculoskeletal: She exhibits edema (lower extremity nonpitting +2). Limited ROM and stiffness in knees and in back. Neurological: She is alert and oriented to person, place, and time. Coordination (ambulates with a walker) abnormal.   Psychiatric: She has a normal mood and affect. Diagnostics  Orders Placed This Encounter    diclofenac (VOLTAREN) 1 % gel     Sig: Apply 4 g to affected area four (4) times daily. Dispense:  100 g     Refill:  0       Results  Results for orders placed or performed during the hospital encounter of 04/10/18   LIPID PANEL   Result Value Ref Range    LIPID PROFILE          Cholesterol, total 269 (H) <200 MG/DL    Triglyceride 107 <150 MG/DL    HDL Cholesterol 61 (H) 40 - 60 MG/DL    LDL, calculated 186.6 (H) 0 - 100 MG/DL    VLDL, calculated 21.4 MG/DL    CHOL/HDL Ratio 4.4 0 - 5.0     METABOLIC PANEL, COMPREHENSIVE   Result Value Ref Range    Sodium 145 136 - 145 mmol/L    Potassium 5.4 3.5 - 5.5 mmol/L    Chloride 111 (H) 100 - 108 mmol/L    CO2 25 21 - 32 mmol/L    Anion gap 9 3.0 - 18 mmol/L    Glucose 109 (H) 74 - 99 mg/dL    BUN 21 (H) 7.0 - 18 MG/DL    Creatinine 1.20 0.6 - 1.3 MG/DL    BUN/Creatinine ratio 18 12 - 20      GFR est AA 52 (L) >60 ml/min/1.73m2    GFR est non-AA 43 (L) >60 ml/min/1.73m2    Calcium 9.1 8.5 - 10.1 MG/DL    Bilirubin, total 0.5 0.2 - 1.0 MG/DL    ALT (SGPT) 13 13 - 56 U/L    AST (SGOT) 12 (L) 15 - 37 U/L    Alk.  phosphatase 75 45 - 117 U/L    Protein, total 6.5 6.4 - 8.2 g/dL    Albumin 3.5 3.4 - 5.0 g/dL    Globulin 3.0 2.0 - 4.0 g/dL    A-G Ratio 1.2 0.8 - 1.7     TSH 3RD GENERATION   Result Value Ref Range    TSH 3.10 0.36 - 3.74 uIU/mL   T4, FREE   Result Value Ref Range    T4, Free 1.1 0.7 - 1.5 NG/DL   HEMOGLOBIN A1C W/O EAG   Result Value Ref Range    Hemoglobin A1c 6.4 (H) 4.2 - 5.6 %         Assessment and Plan  Diagnoses and all orders for this visit:    1. Essential hypertension    2. Hyperlipidemia, unspecified hyperlipidemia type    3. Hypothyroidism, unspecified type    4. Elevated fasting blood sugar    5. Arthritis of knee  -     diclofenac (VOLTAREN) 1 % gel; Apply 4 g to affected area four (4) times daily. Lab results reviewed including GFR and creatinine with patient. She does not wish to make any changes to her current treatment regimen. After care summary printed and reviewed with patient. Plan reviewed with patient. Questions answered. Patient verbalized understanding of plan and is in agreement with plan. Patient to follow up three months or earlier if symptoms worsen.      Jeramy Peters, RADHAP-C

## 2018-04-17 NOTE — MR AVS SNAPSHOT
[facility] 
 
 
 [address] [place] [id number] [phone] Patient: Timothy Madden MRN:  [id number] Visit Information Date & Time Provider Department Dept. Phone Encounter #  
 4/17/2018  2:00 PM Gurpreet Baker NP [facility] [phone] [id number] Follow-up Instructions Return in about 3 months (around 7/17/2018), or if symptoms worsen or fail to improve. Upcoming Health Maintenance Date Due DTaP/Tdap/Td series (1 - Tdap) 1/28/1954 GLAUCOMA SCREENING Q2Y 5/21/2018 MEDICARE YEARLY EXAM 8/2/2018 Allergies as of 4/17/2018  Review Complete On: 4/17/2018 By: eMgan Hwang LPN Severity Noted Reaction Type Reactions Lipitor [Atorvastatin]  07/29/2010    Other (comments) Leg aches Zocor [Simvastatin]  07/29/2010    Other (comments) Leg aches Current Immunizations  Reviewed on 8/1/2017 No immunizations on file. Not reviewed this visit You Were Diagnosed With   
  
 Codes Comments Essential hypertension    -  Primary ICD-10-CM: I10 
ICD-9-CM: 401.9 Hyperlipidemia, unspecified hyperlipidemia type     ICD-10-CM: E78.5 ICD-9-CM: 272.4 Hypothyroidism, unspecified type     ICD-10-CM: E03.9 ICD-9-CM: 244.9 Elevated fasting blood sugar     ICD-10-CM: R73.01 
ICD-9-CM: 790.21 Arthritis of knee     ICD-10-CM: M17.10 ICD-9-CM: 716.96 Vitals BP Pulse Temp Resp Height(growth percentile) Weight(growth percentile) 138/80 (BP 1 Location: Right arm, BP Patient Position: Sitting) 64 98.1 °F (36.7 °C) (Oral) 14 5' 5\" (1.651 m) 187 lb (84.8 kg) SpO2 BMI OB Status Smoking Status 99% 31.12 kg/m2 Postmenopausal Never Smoker BMI and BSA Data Body Mass Index Body Surface Area  
 31.12 kg/m 2 1.97 m 2 Preferred Pharmacy Pharmacy Name Phone [address], [address] [facility] Your Updated Medication List  
  
   
This list is accurate as of 4/17/18  2:32 PM.  Always use your most recent med list.  
  
  
  
  
 acetaminophen 650 mg Tber Commonly known as:  TYLENOL Take 650 mg by mouth every six (6) hours as needed for Pain. amLODIPine-benazepril 5-20 mg per capsule Commonly known as:  LOTREL  
TAKE ONE CAPSULE BY MOUTH ONCE DAILY  
  
 aspirin delayed-release 81 mg tablet Take  by mouth daily. diclofenac 1 % Gel Commonly known as:  VOLTAREN Apply 4 g to affected area four (4) times daily. FISH OIL PO Take  by mouth two (2) times a day. levothyroxine 100 mcg tablet Commonly known as:  SYNTHROID Take 1 Tab by mouth Daily (before breakfast). lisinopril 40 mg tablet Commonly known as:  PRINIVIL, ZESTRIL  
TAKE ONE TABLET BY MOUTH ONCE DAILY PRED FORTE 1 % ophthalmic suspension Generic drug:  prednisoLONE acetate Administer 1 Drop to left eye three (3) times daily. timolol 0.5 % ophthalmic gel-forming Commonly known as:  TIMOPTIC-XE Administer 1 Drop to right eye daily. travoprost 0.004 % ophthalmic solution Commonly known as:  TRAVATAN Z Administer 1 Drop to right eye every evening. Prescriptions Sent to Pharmacy Refills  
 diclofenac (VOLTAREN) 1 % gel 0 Sig: Apply 4 g to affected area four (4) times daily. Class: Normal  
 Pharmacy: [facility] [address] BLANCO [address] Ph #: [phone] Route: Topical  
  
Follow-up Instructions Return in about 3 months (around 7/17/2018), or if symptoms worsen or fail to improve. Patient Instructions Please contact our office if you have any questions about your visit today. Introducing [facility] & HEALTH SERVICES! Dear Collins May: Thank you for requesting a [employer] account.   Our records indicate that you have previously registered for a [employer] account but its currently inactive. Please call our [employer] support line at [phone]. Additional Information If you have questions, please visit the Frequently Asked Questions section of the [employer] website at [email or web address] Remember, [employer] is NOT to be used for urgent needs. For medical emergencies, dial 911. Now available from your iPhone and Android! Please provide this summary of care documentation to your next provider. Your primary care clinician is listed as YASMINE ART. If you have any questions after today's visit, please call [phone].

## 2018-04-17 NOTE — PROGRESS NOTES
Chief Complaint   Patient presents with    Follow-up     Patient last seen in office 12/04/2017    Medication Refill    Medication Evaluation     Patient would like a prescription for Voltaren Gel 1%. Last prescription was writtten 9/14/2015     1. Have you been to the ER, urgent care clinic since your last visit? Hospitalized since your last visit? No    2. Have you seen or consulted any other health care providers outside of the [address] [place] [address] since your last visit? Include any pap smears or colon screening.  No     Health Maintenance Due   Topic Date Due    DTaP/Tdap/Td series (1 - Tdap) 01/28/1954    GLAUCOMA SCREENING Q2Y  05/21/2018

## 2018-06-26 RX ORDER — AMLODIPINE AND BENAZEPRIL HYDROCHLORIDE 5; 20 MG/1; MG/1
CAPSULE ORAL
Qty: 90 CAP | Refills: 1 | Status: SHIPPED | OUTPATIENT
Start: 2018-06-26 | End: 2018-06-28 | Stop reason: SDUPTHER

## 2018-06-28 RX ORDER — AMLODIPINE AND BENAZEPRIL HYDROCHLORIDE 5; 20 MG/1; MG/1
CAPSULE ORAL
Qty: 90 CAP | Refills: 1 | Status: SHIPPED | OUTPATIENT
Start: 2018-06-28 | End: 2018-07-02 | Stop reason: SDUPTHER

## 2018-07-02 RX ORDER — AMLODIPINE AND BENAZEPRIL HYDROCHLORIDE 5; 20 MG/1; MG/1
CAPSULE ORAL
Qty: 90 CAP | Refills: 1 | Status: SHIPPED | OUTPATIENT
Start: 2018-07-02 | End: 2018-12-13 | Stop reason: DRUGHIGH

## 2018-07-17 ENCOUNTER — OFFICE VISIT (OUTPATIENT)
Dept: FAMILY MEDICINE CLINIC | Age: 83
End: 2018-07-17

## 2018-07-17 VITALS
DIASTOLIC BLOOD PRESSURE: 76 MMHG | HEIGHT: 65 IN | OXYGEN SATURATION: 96 % | SYSTOLIC BLOOD PRESSURE: 142 MMHG | HEART RATE: 61 BPM | RESPIRATION RATE: 16 BRPM | TEMPERATURE: 97.2 F

## 2018-07-17 DIAGNOSIS — E03.9 HYPOTHYROIDISM, UNSPECIFIED TYPE: ICD-10-CM

## 2018-07-17 DIAGNOSIS — E78.5 HYPERLIPIDEMIA, UNSPECIFIED HYPERLIPIDEMIA TYPE: Primary | ICD-10-CM

## 2018-07-17 DIAGNOSIS — M17.10 ARTHRITIS OF KNEE: ICD-10-CM

## 2018-07-17 DIAGNOSIS — R60.9 CHRONIC EDEMA: ICD-10-CM

## 2018-07-17 DIAGNOSIS — R73.03 PREDIABETES: ICD-10-CM

## 2018-07-17 DIAGNOSIS — I10 ESSENTIAL HYPERTENSION: ICD-10-CM

## 2018-07-17 RX ORDER — DICLOFENAC SODIUM 10 MG/G
4 GEL TOPICAL 4 TIMES DAILY
Qty: 100 G | Refills: 0 | Status: SHIPPED | OUTPATIENT
Start: 2018-07-17

## 2018-07-17 NOTE — PROGRESS NOTES
Chief Complaint   Patient presents with    Follow-up     Patient last seen in office 4/17/2018     1. Have you been to the ER, urgent care clinic since your last visit? Hospitalized since your last visit? No    2. Have you seen or consulted any other health care providers outside of the [address] [place] [address] since your last visit? Include any pap smears or colon screening.  No     Health Maintenance Due   Topic Date Due    DTaP/Tdap/Td series (1 - Tdap) 01/28/1954    GLAUCOMA SCREENING Q2Y  05/21/2018

## 2018-07-17 NOTE — MR AVS SNAPSHOT
[address] [facility] 
 
 
 [address] [address] [place] [id number] [phone] Patient: Tamir Wright MRN:  [id number] Visit Information Date & Time Provider Department Dept. Phone Encounter #  
 2018  2:30 PM Elizabeth Wills NP [address] [id number] Follow-up Instructions Return in about 3 months (around 10/17/2018), or if symptoms worsen or fail to improve. Upcoming Health Maintenance Date Due DTaP/Tdap/Td series (1 - Tdap) 1954 GLAUCOMA SCREENING Q2Y 2018 Influenza Age 5 to Adult 2018 MEDICARE YEARLY EXAM 2018 Allergies as of 2018  Review Complete On: 2018 By: Elizabeth Wills NP Severity Noted Reaction Type Reactions Lipitor [Atorvastatin]  2010    Other (comments) Leg aches Zocor [Simvastatin]  2010    Other (comments) Leg aches Current Immunizations  Reviewed on 2017 No immunizations on file. Not reviewed this visit You Were Diagnosed With   
  
 Codes Comments Hyperlipidemia, unspecified hyperlipidemia type    -  Primary ICD-10-CM: E78.5 ICD-9-CM: 272.4 Essential hypertension     ICD-10-CM: I10 
ICD-9-CM: 401.9 Hypothyroidism, unspecified type     ICD-10-CM: E03.9 ICD-9-CM: 244.9 Arthritis of knee     ICD-10-CM: M17.10 ICD-9-CM: 716.96 Chronic edema     ICD-10-CM: R60.9 ICD-9-CM: 782.3 Prediabetes     ICD-10-CM: R73.03 
ICD-9-CM: 790.29 Vitals OB Status Smoking Status Postmenopausal Never Smoker Preferred Pharmacy Pharmacy Name Phone [address], [address] Your Updated Medication List  
  
   
This list is accurate as of 18  3:34 PM.  Always use your most recent med list.  
  
  
  
  
 acetaminophen 650 mg Tber Commonly known as:  TYLENOL  
 Take 650 mg by mouth every six (6) hours as needed for Pain. amLODIPine-benazepril 5-20 mg per capsule Commonly known as:  LOTREL  
TAKE 1 CAPSULE BY MOUTH ONCE DAILY  
  
 aspirin delayed-release 81 mg tablet Take  by mouth daily. diclofenac 1 % Gel Commonly known as:  VOLTAREN Apply 4 g to affected area four (4) times daily. FISH OIL PO Take  by mouth two (2) times a day. levothyroxine 100 mcg tablet Commonly known as:  SYNTHROID Take 1 Tab by mouth Daily (before breakfast). lisinopril 40 mg tablet Commonly known as:  PRINIVIL, ZESTRIL  
TAKE ONE TABLET BY MOUTH ONCE DAILY PRED FORTE 1 % ophthalmic suspension Generic drug:  prednisoLONE acetate Administer 1 Drop to left eye three (3) times daily. timolol 0.5 % ophthalmic gel-forming Commonly known as:  TIMOPTIC-XE Administer 1 Drop to right eye daily. travoprost 0.004 % ophthalmic solution Commonly known as:  TRAVATAN Z Administer 1 Drop to right eye every evening. Prescriptions Sent to Pharmacy Refills  
 diclofenac (VOLTAREN) 1 % gel 0 Sig: Apply 4 g to affected area four (4) times daily. Class: Normal  
 Pharmacy: [address] [facility] [address] Ph #: [phone] Route: Topical  
  
Follow-up Instructions Return in about 3 months (around 10/17/2018), or if symptoms worsen or fail to improve. To-Do List   
 07/17/2018 Lab:  CBC WITH AUTOMATED DIFF   
  
 07/17/2018 Lab:  HEMOGLOBIN A1C WITH EAG   
  
 07/17/2018 Lab:  T4, FREE Around 10/15/2018 Lab:  LIPID PANEL Around 10/15/2018 Lab:  METABOLIC PANEL, COMPREHENSIVE Around 10/15/2018 Lab:  TSH 3RD GENERATION Introducing [facility] & HEALTH SERVICES! Dear Timmy Felix: Thank you for requesting a [employer] account.   Our records indicate that you have previously registered for a [employer] account but its currently inactive. Please call our [employer] support line at [phone]. Additional Information If you have questions, please visit the Frequently Asked Questions section of the [employer] website at [email or web address] Remember, [employer] is NOT to be used for urgent needs. For medical emergencies, dial 911. Now available from your iPhone and Android! Please provide this summary of care documentation to your next provider. Your primary care clinician is listed as YASMINE ART. If you have any questions after today's visit, please call [phone].

## 2018-07-17 NOTE — PROGRESS NOTES
[place]  Buzz Vasques is a 80 y.o. female    Here to follow up. Reports swelling in her lower extremities. Reports elevation of her lower extremities. Denies salt/sodium intake. Denies chest pain and or shortness of breath. Admits to chronic knee pain. Reports using some gel on her knees but states she needs some more. Past Medical History  Past Medical History:   Diagnosis Date    Arthritis of knee 2/17/2012    CAD (coronary artery disease)     Glaucoma     HTN (hypertension)     Hyperlipidemia     Hypertensive cardiovascular disease     Hypothyroidism     Osteopenia     Prediabetes 3/15/2013       Surgical History  No past surgical history on file. Medications  Current Outpatient Prescriptions   Medication Sig Dispense Refill    diclofenac (VOLTAREN) 1 % gel Apply 4 g to affected area four (4) times daily. 100 g 0    amLODIPine-benazepril (LOTREL) 5-20 mg per capsule TAKE 1 CAPSULE BY MOUTH ONCE DAILY 90 Cap 1    levothyroxine (SYNTHROID) 100 mcg tablet Take 1 Tab by mouth Daily (before breakfast). (Patient taking differently: Take 112 mcg by mouth Daily (before breakfast). ) 90 Tab 3    lisinopril (PRINIVIL, ZESTRIL) 40 mg tablet TAKE ONE TABLET BY MOUTH ONCE DAILY 90 Tab 3    acetaminophen (TYLENOL) 650 mg CR tablet Take 650 mg by mouth every six (6) hours as needed for Pain.  aspirin delayed-release 81 mg tablet Take  by mouth daily.  timolol (TIMOPTIC-XR) 0.5 % ophthalmic gel-forming Administer 1 Drop to right eye daily.  travoprost (TRAVATAN) 0.004 % ophthalmic solution Administer 1 Drop to right eye every evening.  DOCOSAHEXANOIC ACID/EPA (FISH OIL PO) Take  by mouth two (2) times a day.  prednisoLONE acetate (PRED FORTE) 1 % ophthalmic suspension Administer 1 Drop to left eye three (3) times daily.          Allergies  Allergies   Allergen Reactions    Lipitor [Atorvastatin] Other (comments)     Leg aches      Zocor [Simvastatin] Other (comments)     Leg aches         Family History  No family history on file. Social History  Social History     Social History    Marital status:      Spouse name: N/A    Number of children: N/A    Years of education: N/A     Occupational History    Not on file. 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 Respiratory: Negative for shortness of breath. Cardiovascular: Negative for chest pain and palpitations. Gastrointestinal: Negative for abdominal pain, blood in stool, nausea and vomiting. Genitourinary: Negative for hematuria. Musculoskeletal: Positive for joint pain. Negative for falls and neck pain. Psychiatric/Behavioral: Negative for depression, substance abuse and suicidal ideas. Vital Signs  There were no vitals filed for this visit. Physical Exam  Physical Exam   Constitutional: She is oriented to person, place, and time. HENT:   Mouth/Throat: Oropharynx is clear and moist.   Cardiovascular: Normal rate and regular rhythm. Pulmonary/Chest: Effort normal and breath sounds normal.   Musculoskeletal: She exhibits edema (lower extremities). Neurological: She is alert and oriented to person, place, and time. Coordination (ambulates with a walker and uses a cane to stand - slow ambulation) abnormal.   Psychiatric: She has a normal mood and affect. Her behavior is normal.   Vitals reviewed.       Diagnostics  Orders Placed This Encounter    CBC WITH AUTOMATED DIFF     Standing Status:   Future     Standing Expiration Date:   0/51/1242    METABOLIC PANEL, COMPREHENSIVE     Standing Status:   Future     Standing Expiration Date:   1/14/2019    LIPID PANEL     Standing Status:   Future     Standing Expiration Date:   1/14/2019    T4, FREE     Standing Status:   Future     Standing Expiration Date:   7/18/2019    TSH 3RD GENERATION     Standing Status:   Future     Standing Expiration Date:   1/14/2019    HEMOGLOBIN A1C WITH EAG     Standing Status:   Future     Standing Expiration Date:   7/18/2019    diclofenac (VOLTAREN) 1 % gel     Sig: Apply 4 g to affected area four (4) times daily. Dispense:  100 g     Refill:  0       Results  Results for orders placed or performed during the hospital encounter of 04/10/18   LIPID PANEL   Result Value Ref Range    LIPID PROFILE          Cholesterol, total 269 (H) <200 MG/DL    Triglyceride 107 <150 MG/DL    HDL Cholesterol 61 (H) 40 - 60 MG/DL    LDL, calculated 186.6 (H) 0 - 100 MG/DL    VLDL, calculated 21.4 MG/DL    CHOL/HDL Ratio 4.4 0 - 5.0     METABOLIC PANEL, COMPREHENSIVE   Result Value Ref Range    Sodium 145 136 - 145 mmol/L    Potassium 5.4 3.5 - 5.5 mmol/L    Chloride 111 (H) 100 - 108 mmol/L    CO2 25 21 - 32 mmol/L    Anion gap 9 3.0 - 18 mmol/L    Glucose 109 (H) 74 - 99 mg/dL    BUN 21 (H) 7.0 - 18 MG/DL    Creatinine 1.20 0.6 - 1.3 MG/DL    BUN/Creatinine ratio 18 12 - 20      GFR est AA 52 (L) >60 ml/min/1.73m2    GFR est non-AA 43 (L) >60 ml/min/1.73m2    Calcium 9.1 8.5 - 10.1 MG/DL    Bilirubin, total 0.5 0.2 - 1.0 MG/DL    ALT (SGPT) 13 13 - 56 U/L    AST (SGOT) 12 (L) 15 - 37 U/L    Alk.  phosphatase 75 45 - 117 U/L    Protein, total 6.5 6.4 - 8.2 g/dL    Albumin 3.5 3.4 - 5.0 g/dL    Globulin 3.0 2.0 - 4.0 g/dL    A-G Ratio 1.2 0.8 - 1.7     TSH 3RD GENERATION   Result Value Ref Range    TSH 3.10 0.36 - 3.74 uIU/mL   T4, FREE   Result Value Ref Range    T4, Free 1.1 0.7 - 1.5 NG/DL   HEMOGLOBIN A1C W/O EAG   Result Value Ref Range    Hemoglobin A1c 6.4 (H) 4.2 - 5.6 %       Assessment and Plan  Diagnoses and all orders for this visit:    1. Hyperlipidemia, unspecified hyperlipidemia type  -     CBC WITH AUTOMATED DIFF; Future  -     LIPID PANEL; Future    2. Essential hypertension  -     CBC WITH AUTOMATED DIFF; Future  -     METABOLIC PANEL, COMPREHENSIVE; Future    3. Hypothyroidism, unspecified type  -     T4, FREE; Future  -     TSH 3RD GENERATION; Future    4. Arthritis of knee  -     diclofenac (VOLTAREN) 1 % gel; Apply 4 g to affected area four (4) times daily. 5. Chronic edema    6. Prediabetes  -     HEMOGLOBIN A1C WITH EAG; Future      Last set of labs discussed with patient. Instructed to go for new labs ordered. After care summary printed and reviewed with patient. Plan reviewed with patient. Questions answered. Patient verbalized understanding of plan and is in agreement with plan. Patient to follow up in three months or earlier if symptoms worsen.      Devika Hopson, RADHAP-C

## 2018-08-13 DIAGNOSIS — I10 ESSENTIAL HYPERTENSION: ICD-10-CM

## 2018-08-16 RX ORDER — LISINOPRIL 40 MG/1
TABLET ORAL
Qty: 90 TAB | Refills: 0 | Status: SHIPPED | OUTPATIENT
Start: 2018-08-16 | End: 2018-11-08 | Stop reason: SDUPTHER

## 2018-10-30 DIAGNOSIS — E03.9 HYPOTHYROIDISM, UNSPECIFIED TYPE: ICD-10-CM

## 2018-10-30 RX ORDER — LEVOTHYROXINE SODIUM 100 UG/1
TABLET ORAL
Qty: 90 TAB | Refills: 0 | Status: SHIPPED | OUTPATIENT
Start: 2018-10-30 | End: 2018-12-13 | Stop reason: SDUPTHER

## 2018-10-30 NOTE — TELEPHONE ENCOUNTER
Requested Prescriptions     Pending Prescriptions Disp Refills    levothyroxine (SYNTHROID) 100 mcg tablet [Pharmacy Med Name: LEVOTHYROXIN 100MCG TAB] 90 Tab 0     Sig: TAKE 1 TABLET BY MOUTH ONCE DAILY BEFORE BREAKFAST

## 2018-12-13 ENCOUNTER — OFFICE VISIT (OUTPATIENT)
Dept: FAMILY MEDICINE CLINIC | Age: 83
End: 2018-12-13

## 2018-12-13 VITALS
RESPIRATION RATE: 18 BRPM | HEIGHT: 65 IN | WEIGHT: 194.2 LBS | HEART RATE: 56 BPM | BODY MASS INDEX: 32.36 KG/M2 | SYSTOLIC BLOOD PRESSURE: 163 MMHG | TEMPERATURE: 97.8 F | DIASTOLIC BLOOD PRESSURE: 71 MMHG

## 2018-12-13 DIAGNOSIS — E03.9 HYPOTHYROIDISM, UNSPECIFIED TYPE: ICD-10-CM

## 2018-12-13 DIAGNOSIS — R60.9 CHRONIC EDEMA: ICD-10-CM

## 2018-12-13 DIAGNOSIS — R73.03 PREDIABETES: ICD-10-CM

## 2018-12-13 DIAGNOSIS — N18.30 STAGE 3 CHRONIC KIDNEY DISEASE (HCC): ICD-10-CM

## 2018-12-13 DIAGNOSIS — I10 ESSENTIAL HYPERTENSION: ICD-10-CM

## 2018-12-13 DIAGNOSIS — E03.9 ACQUIRED HYPOTHYROIDISM: ICD-10-CM

## 2018-12-13 DIAGNOSIS — Z01.818 PRE-OP EVALUATION: Primary | ICD-10-CM

## 2018-12-13 RX ORDER — AMLODIPINE BESYLATE 10 MG/1
10 TABLET ORAL DAILY
Qty: 90 TAB | Refills: 3 | Status: SHIPPED | OUTPATIENT
Start: 2018-12-13 | End: 2019-11-19 | Stop reason: SDUPTHER

## 2018-12-13 RX ORDER — LISINOPRIL 40 MG/1
40 TABLET ORAL DAILY
Qty: 90 TAB | Refills: 3 | Status: SHIPPED | OUTPATIENT
Start: 2018-12-13 | End: 2018-12-17 | Stop reason: SDUPTHER

## 2018-12-13 RX ORDER — LEVOTHYROXINE SODIUM 100 UG/1
100 TABLET ORAL DAILY
Qty: 90 TAB | Refills: 3 | Status: SHIPPED | OUTPATIENT
Start: 2018-12-13 | End: 2018-12-17 | Stop reason: SDUPTHER

## 2018-12-13 NOTE — PROGRESS NOTES
Dina Parekh presents today for   Chief Complaint   Patient presents with    Pre-op Exam     Left eye surgery       Is someone accompanying this pt? no    Is the patient using any DME equipment during OV? Yes walker    Depression Screening:  PHQ over the last two weeks 4/17/2018   Little interest or pleasure in doing things Not at all   Feeling down, depressed, irritable, or hopeless Not at all   Total Score PHQ 2 0       Learning Assessment:  Learning Assessment 12/13/2018   PRIMARY LEARNER Patient   HIGHEST LEVEL OF EDUCATION - PRIMARY LEARNER  GRADUATED HIGH SCHOOL OR GED   BARRIERS PRIMARY LEARNER NONE   CO-LEARNER CAREGIVER No   PRIMARY LANGUAGE ENGLISH   LEARNER PREFERENCE PRIMARY LISTENING   ANSWERED BY self   RELATIONSHIP SELF       Abuse Screening:  Abuse Screening Questionnaire 7/17/2018   Do you ever feel afraid of your partner? N   Are you in a relationship with someone who physically or mentally threatens you? N   Is it safe for you to go home? Y           Health Maintenance Due   Topic Date Due    Shingrix Vaccine Age 49> (1 of 2) 01/28/1983    GLAUCOMA SCREENING Q2Y  05/21/2018    Influenza Age 9 to Adult  08/01/2018    MEDICARE YEARLY EXAM  08/02/2018   . Health Maintenance reviewed and discussed and ordered per Provider. Dina Parekh is updated on all       Coordination of Care:  1. Have you been to the ER, urgent care clinic since your last visit? Hospitalized since your last visit? no    2. Have you seen or consulted any other health care providers outside of the [address] [place] [address] since your last visit? Include any pap smears or colon screening. no    Per-Dr. Katie Gomez ok medication not taking to be deleted. Advance Directive:  1. Do you have an advance directive in place? Patient Reply:no    2. If not, would you like material regarding how to put one in place?  Patient Reply: no

## 2018-12-13 NOTE — PROGRESS NOTES
Date of Exam: 2018     Angeline Moreno is a 80 y.o. female (: 1933) with h/o hypothyroid, renal insufficiency, HTN, Pre-DM, CAD who presents for preoperative evaluation. Procedure/Surgery: Glaucoma surgery -  L eye Procedure code 661.80; Dx Code: Y88.5177 per pre-op oders  Date of Procedure/Surgery: 19  Surgeon: Dr. Ginny Pina. [facility] [facility]  Primary Physician: Lina Tran NP   Latex Allergy: no    Surgical History:   No past surgical history on file. Patient Active Problem List   Diagnosis Code    Hyperlipidemia E78.5    Hypertension I10    Hypothyroidism E03.9    Arthritis of knee M17.10    Arthritis of back M47.9    Chronic edema R60.9    Prediabetes R73.03    ACP (advance care planning) Z71.89    Stage 3 chronic kidney disease (HCC) N18.3     Prior surgeries: none    No recent use of aspirin (ASA), NSAIDS or steroids    Tetanus up to date: last tetanus booster within 10 years : Tdap given 8/15/16    Anesthesia Complications: None - no personal or fam h/o anesthesia reactions  History of abnormal bleeding : None - no personal or fam h/o bleeding disorders  History of Blood Transfusions: no   Blood clots: DVT L leg 3 yrs ago and completed course of AC - this occurred after vein surgery. Health Care Directive or Living Will: no      Tobacco: Never  Alcohol: None  Drugs: never     Exercise Tolerance: <4 mets due to msk discomfort    Current Outpatient Medications on File Prior to Visit   Medication Sig Dispense Refill    diclofenac (VOLTAREN) 1 % gel Apply 4 g to affected area four (4) times daily. 100 g 0    acetaminophen (TYLENOL) 650 mg CR tablet Take 650 mg by mouth every six (6) hours as needed for Pain.  timolol (TIMOPTIC-XR) 0.5 % ophthalmic gel-forming Administer 1 Drop to right eye daily.  travoprost (TRAVATAN) 0.004 % ophthalmic solution Administer 1 Drop to right eye every evening.       DOCOSAHEXANOIC ACID/EPA (FISH OIL PO) Take  by mouth two (2) times a day.  prednisoLONE acetate (PRED FORTE) 1 % ophthalmic suspension Administer 1 Drop to left eye three (3) times daily.  [DISCONTINUED] lisinopril (PRINIVIL, ZESTRIL) 40 mg tablet TAKE 1 TABLET BY MOUTH ONCE DAILY 45 Tab 0     No current facility-administered medications on file prior to visit. Allergies   Allergen Reactions    Lipitor [Atorvastatin] Other (comments)     Leg aches      Zocor [Simvastatin] Other (comments)     Leg aches         REVIEW OF SYSTEMS:  A comprehensive review of systems was negative except for that written in the HPI. GENERAL: NEGATIVE, denies recent fevers  HEENT: NEGATIVE  CV: DENIES CP, SOB, LE EDEMA  PULM: Denies Cough, SOB  GI: Denies N/V/D  : NEGATIVE  MSK: NEGATIVE  NEURO: neg for h/a, dizziness    EXAM:     Visit Vitals  /73 (BP 1 Location: Left arm, BP Patient Position: Sitting)   Pulse (!) 56   Temp 97.8 °F (36.6 °C) (Oral)   Resp 18   Ht 5' 5\" (1.651 m)   Wt 194 lb 3.2 oz (88.1 kg)   BMI 32.32 kg/m²     Rpt BP: 163/71    PHYSICAL EXAM:  General:  Alert, cooperative, no distress, appears stated age. Ambulating with assistance of rolling walker   Head:  Normocephalic, without obvious abnormality, atraumatic. Eyes:  Conjunctivae/corneas clear. PERRL, EOMs intact. Fundi benign. Anicteric   Ears:  Normal TMs and external ear canals both ears. Nose: Nares normal. Septum midline. Mucosa normal. No drainage or sinus tenderness. Throat: Lips, mucosa, and tongue normal. Teeth and gums normal.   Neck: Supple, symmetrical, trachea midline, no adenopathy, thyroid: no enlargement/tenderness/nodules, no carotid bruit and no JVD. Back:   Symmetric. No CVA tenderness. Lungs:   Clear to auscultation bilaterally. No wheezing, ronchi, rales. Equal BS B/L   Chest wall:  No tenderness or deformity. Heart:  Regular rate and rhythm, S1, S2 normal, no murmur, click, rub or gallop.    Abdomen: Soft, non-tender. Bowel sounds normal. No masses,  No organomegaly. No abdominal bruits B/L   Extremities: Extremities normal, atraumatic, no cyanosis. Stable 2+ edema BLE   Pulses: 2+ and symmetric all extremities. Skin: Skin color, texture, turgor normal. No rashes or lesions. Lymph nodes: Cervical and supraclavicular nodes normal.   Neurologic: CNII-XII grossly intact. Normal reflexes throughout. DIAGNOSTICS:   1. EKG: EKG FINDINGS - 48BPM, sinus bradycardia, non-specific TWI in III and V1. No ST abnormalities. No priors for comparison. WNL for age. 2. CXR: None indicated: lifelong non-smoker, no h/o asthma, no pulmonary complaints  3. Labs:PENDING    Orders Placed This Encounter    CBC WITH AUTOMATED DIFF     Standing Status:   Future     Standing Expiration Date:   23/57/7432    METABOLIC PANEL, COMPREHENSIVE     Standing Status:   Future     Standing Expiration Date:   12/14/2019    URINALYSIS W/ RFLX MICROSCOPIC     Standing Status:   Future     Standing Expiration Date:   12/14/2019    TSH 3RD GENERATION     Standing Status:   Future     Standing Expiration Date:   12/14/2019    T4, FREE     Standing Status:   Future     Standing Expiration Date:   12/14/2019    AMB POC EKG ROUTINE W/ 12 LEADS, INTER & REP     Order Specific Question:   Reason for Exam:     Answer:   79 y/o with HTN: Pre-Op     IMPRESSION:       ICD-10-CM ICD-9-CM    1. Pre-op evaluation Z01.818 V72.84 CBC WITH AUTOMATED DIFF      METABOLIC PANEL, COMPREHENSIVE      URINALYSIS W/ RFLX MICROSCOPIC      TSH 3RD GENERATION      AMB POC EKG ROUTINE W/ 12 LEADS, INTER & REP   2. Essential hypertension I10 401.9 CBC WITH AUTOMATED DIFF      METABOLIC PANEL, COMPREHENSIVE      URINALYSIS W/ RFLX MICROSCOPIC      TSH 3RD GENERATION      AMB POC EKG ROUTINE W/ 12 LEADS, INTER & REP   3. Acquired hypothyroidism E03.9 244.9 TSH 3RD GENERATION      T4, FREE   4. Stage 3 chronic kidney disease (HCC) N18.3 585.3    5.  Prediabetes R73.03 790.29    6. Chronic edema R60.9 782.3        Current Outpatient Medications:     lisinopril (PRINIVIL, ZESTRIL) 40 mg tablet, Take 1 Tab by mouth daily. , Disp: 90 Tab, Rfl: 3    levothyroxine (SYNTHROID) 100 mcg tablet, Take 1 Tab by mouth daily. , Disp: 90 Tab, Rfl: 3    amLODIPine (NORVASC) 10 mg tablet, Take 1 Tab by mouth daily. , Disp: 90 Tab, Rfl: 3    diclofenac (VOLTAREN) 1 % gel, Apply 4 g to affected area four (4) times daily. , Disp: 100 g, Rfl: 0    acetaminophen (TYLENOL) 650 mg CR tablet, Take 650 mg by mouth every six (6) hours as needed for Pain., Disp: , Rfl:     timolol (TIMOPTIC-XR) 0.5 % ophthalmic gel-forming, Administer 1 Drop to right eye daily. , Disp: , Rfl:     travoprost (TRAVATAN) 0.004 % ophthalmic solution, Administer 1 Drop to right eye every evening., Disp: , Rfl:     DOCOSAHEXANOIC ACID/EPA (FISH OIL PO), Take  by mouth two (2) times a day., Disp: , Rfl:     prednisoLONE acetate (PRED FORTE) 1 % ophthalmic suspension, Administer 1 Drop to left eye three (3) times daily. , Disp: , Rfl:     1. Pre-OP  79 y/o with h/o HTN, Hypothyroid, Stage 3B CKD, Pre-DM here for pre-op evaluation at the request of Ophthalmologist Dr. Yvan Snider prior to L eye glaucoma surgery  EKG Findings WNL for age as noted above  Check labs as per lab orders  She is not taking any blood thinners or ASA or NSAIDs and I told her to avoid ASA/NSAIDs 7 days prior to surgery  Advised her to stop fish oils 7 days prior to surgery  Advised her to take levothyroxine, lisinopril and amlodipine with small sip of water AM of surgery  Advised her to ambulate ASAP after surgery to prevent blood clots  She is intermediate CV risk for this low-risk surgical procedure and may proceed with surgery as planned pending review of lab results. Will send Pre-op Note, lab results, EKG to Dr. Dar Vang at Fax: [phone] [facility] Location) Per Pre-Op orders.      2. HTN  She is taking Lisinopril 40mg daily  She is also taking Amlodipine-Benazepril 5mg/20mg daily but she should not take the above 2 meds together as she is taking currently 2 ACE-I  Advised her to continue lisinopril 40mg daily  Stop Amlodipine/Benazepril  Start Amlodipine and  Increase dose from 5mg to 10mg  Low salt diet advised  She has appt next week for physical so I advised her will check BP again at that time and determine if we need to add another BP med prior to surgery. 3. HYPOTHYROID  Continue Levothyroxine at current dose  Check TFTs with labs    4. Stage 3 CKD  Stable  Check UA and CMP with labs prior to surgery    5. Pre-DM  Check CMP and A1c with pre-op labs    6. CHRONIC EDEMA  Stable 2+ LE edema  EKG normal for age  Has appt next week for physical    Pre-Op Risks: Stage 3 CKD, HTN  No contraindications to planned surgery pending review of lab results. Advised to take BP meds AM of surgery with small sip of water  She is not taking any ASA or NSAIDs and told her not to take these 7 days prior to surgery  Advised her to stop fish oils 7 days before surgery, may resume day after surgery unless told otherwise by ophtho. Advised to ambulate ASAP after surgery to reduce risk of blood clots.      Amira Bell MD   12/13/2018

## 2018-12-13 NOTE — PATIENT INSTRUCTIONS
High Blood Pressure: Care Instructions  Your Care Instructions    If your blood pressure is usually above 130/80, you have high blood pressure, or hypertension. That means the top number is 130 or higher or the bottom number is 80 or higher, or both. Despite what a lot of people think, high blood pressure usually doesn't cause headaches or make you feel dizzy or lightheaded. It usually has no symptoms. But it does increase your risk for heart attack, stroke, and kidney or eye damage. The higher your blood pressure, the more your risk increases. Your doctor will give you a goal for your blood pressure. Your goal will be based on your health and your age. Lifestyle changes, such as eating healthy and being active, are always important to help lower blood pressure. You might also take medicine to reach your blood pressure goal.  Follow-up care is a key part of your treatment and safety. Be sure to make and go to all appointments, and call your doctor if you are having problems. It's also a good idea to know your test results and keep a list of the medicines you take. How can you care for yourself at home? Medical treatment  · If you stop taking your medicine, your blood pressure will go back up. You may take one or more types of medicine to lower your blood pressure. Be safe with medicines. Take your medicine exactly as prescribed. Call your doctor if you think you are having a problem with your medicine. · Talk to your doctor before you start taking aspirin every day. Aspirin can help certain people lower their risk of a heart attack or stroke. But taking aspirin isn't right for everyone, because it can cause serious bleeding. · See your doctor regularly. You may need to see the doctor more often at first or until your blood pressure comes down. · If you are taking blood pressure medicine, talk to your doctor before you take decongestants or anti-inflammatory medicine, such as ibuprofen.  Some of these medicines can raise blood pressure. · Learn how to check your blood pressure at home. Lifestyle changes  · Stay at a healthy weight. This is especially important if you put on weight around the waist. Losing even 10 pounds can help you lower your blood pressure. · If your doctor recommends it, get more exercise. Walking is a good choice. Bit by bit, increase the amount you walk every day. Try for at least 30 minutes on most days of the week. You also may want to swim, bike, or do other activities. · Avoid or limit alcohol. Talk to your doctor about whether you can drink any alcohol. · Try to limit how much sodium you eat to less than 2,300 milligrams (mg) a day. Your doctor may ask you to try to eat less than 1,500 mg a day. · Eat plenty of fruits (such as bananas and oranges), vegetables, legumes, whole grains, and low-fat dairy products. · Lower the amount of saturated fat in your diet. Saturated fat is found in animal products such as milk, cheese, and meat. Limiting these foods may help you lose weight and also lower your risk for heart disease. · Do not smoke. Smoking increases your risk for heart attack and stroke. If you need help quitting, talk to your doctor about stop-smoking programs and medicines. These can increase your chances of quitting for good. When should you call for help? Call 911 anytime you think you may need emergency care. This may mean having symptoms that suggest that your blood pressure is causing a serious heart or blood vessel problem. Your blood pressure may be over 180/120.   For example, call 911 if:    · You have symptoms of a heart attack. These may include:  ? Chest pain or pressure, or a strange feeling in the chest.  ? Sweating. ? Shortness of breath. ? Nausea or vomiting. ? Pain, pressure, or a strange feeling in the back, neck, jaw, or upper belly or in one or both shoulders or arms. ? Lightheadedness or sudden weakness.   ? A fast or irregular heartbeat.     · You have symptoms of a stroke. These may include:  ? Sudden numbness, tingling, weakness, or loss of movement in your face, arm, or leg, especially on only one side of your body. ? Sudden vision changes. ? Sudden trouble speaking. ? Sudden confusion or trouble understanding simple statements. ? Sudden problems with walking or balance. ? A sudden, severe headache that is different from past headaches.     · You have severe back or belly pain.    Do not wait until your blood pressure comes down on its own. Get help right away.   Call your doctor now or seek immediate care if:    · Your blood pressure is much higher than normal (such as 180/120 or higher), but you don't have symptoms.     · You think high blood pressure is causing symptoms, such as:  ? Severe headache.  ? Blurry vision.    Watch closely for changes in your health, and be sure to contact your doctor if:    · Your blood pressure measures higher than your doctor recommends at least 2 times. That means the top number is higher or the bottom number is higher, or both.     · You think you may be having side effects from your blood pressure medicine. Where can you learn more? Go to http://mila-jamie.info/. Enter H413 in the search box to learn more about \"High Blood Pressure: Care Instructions. \"  Current as of: December 6, 2017  Content Version: 11.8  © 9278-5304 [employer]. Care instructions adapted under license by [employer] (which disclaims liability or warranty for this information). If you have questions about a medical condition or this instruction, always ask your healthcare professional. [place] [address] any warranty or liability for your use of this information.

## 2018-12-17 ENCOUNTER — HOSPITAL ENCOUNTER (OUTPATIENT)
Dept: LAB | Age: 83
Discharge: HOME | End: 2018-12-17
Payer: MEDICARE

## 2018-12-17 ENCOUNTER — OFFICE VISIT (OUTPATIENT)
Dept: FAMILY MEDICINE CLINIC | Age: 83
End: 2018-12-17

## 2018-12-17 VITALS
DIASTOLIC BLOOD PRESSURE: 76 MMHG | HEART RATE: 64 BPM | SYSTOLIC BLOOD PRESSURE: 142 MMHG | RESPIRATION RATE: 18 BRPM | TEMPERATURE: 97.6 F | WEIGHT: 194 LBS | BODY MASS INDEX: 32.32 KG/M2 | HEIGHT: 65 IN

## 2018-12-17 DIAGNOSIS — R73.03 PREDIABETES: ICD-10-CM

## 2018-12-17 DIAGNOSIS — E03.9 HYPOTHYROIDISM, UNSPECIFIED TYPE: ICD-10-CM

## 2018-12-17 DIAGNOSIS — Z00.00 ENCOUNTER FOR INITIAL PREVENTIVE PHYSICAL EXAMINATION COVERED BY MEDICARE: ICD-10-CM

## 2018-12-17 DIAGNOSIS — Z13.820 SCREENING FOR OSTEOPOROSIS: ICD-10-CM

## 2018-12-17 DIAGNOSIS — I10 ESSENTIAL HYPERTENSION: ICD-10-CM

## 2018-12-17 DIAGNOSIS — Z01.818 PRE-OP EVALUATION: ICD-10-CM

## 2018-12-17 DIAGNOSIS — E03.9 ACQUIRED HYPOTHYROIDISM: ICD-10-CM

## 2018-12-17 DIAGNOSIS — R60.9 CHRONIC EDEMA: ICD-10-CM

## 2018-12-17 DIAGNOSIS — E78.2 MIXED HYPERLIPIDEMIA: ICD-10-CM

## 2018-12-17 DIAGNOSIS — N18.30 STAGE 3 CHRONIC KIDNEY DISEASE (HCC): ICD-10-CM

## 2018-12-17 DIAGNOSIS — Z00.00 ENCOUNTER FOR INITIAL PREVENTIVE PHYSICAL EXAMINATION COVERED BY MEDICARE: Primary | ICD-10-CM

## 2018-12-17 DIAGNOSIS — Z12.11 SCREEN FOR COLON CANCER: ICD-10-CM

## 2018-12-17 DIAGNOSIS — Z12.39 SCREENING FOR BREAST CANCER: ICD-10-CM

## 2018-12-17 LAB
ALBUMIN SERPL-MCNC: 3.3 G/DL (ref 3.4–5)
ALBUMIN/GLOB SERPL: 1.2 {RATIO} (ref 0.8–1.7)
ALP SERPL-CCNC: 73 U/L (ref 45–117)
ALT SERPL-CCNC: 14 U/L (ref 13–56)
ANION GAP SERPL CALC-SCNC: 4 MMOL/L (ref 3–18)
AST SERPL-CCNC: 10 U/L (ref 15–37)
BASOPHILS # BLD: 0 K/UL (ref 0–0.1)
BASOPHILS NFR BLD: 0 % (ref 0–2)
BILIRUB SERPL-MCNC: 0.5 MG/DL (ref 0.2–1)
BNP SERPL-MCNC: 920 PG/ML (ref 0–1800)
BUN SERPL-MCNC: 23 MG/DL (ref 7–18)
BUN/CREAT SERPL: 20 (ref 12–20)
CALCIUM SERPL-MCNC: 8.6 MG/DL (ref 8.5–10.1)
CHLORIDE SERPL-SCNC: 112 MMOL/L (ref 100–108)
CHOLEST SERPL-MCNC: 244 MG/DL
CO2 SERPL-SCNC: 26 MMOL/L (ref 21–32)
CREAT SERPL-MCNC: 1.15 MG/DL (ref 0.6–1.3)
DIFFERENTIAL METHOD BLD: ABNORMAL
EOSINOPHIL # BLD: 0.1 K/UL (ref 0–0.4)
EOSINOPHIL NFR BLD: 2 % (ref 0–5)
ERYTHROCYTE [DISTWIDTH] IN BLOOD BY AUTOMATED COUNT: 14.4 % (ref 11.6–14.5)
GLOBULIN SER CALC-MCNC: 2.8 G/DL (ref 2–4)
GLUCOSE SERPL-MCNC: 96 MG/DL (ref 74–99)
HCT VFR BLD AUTO: 38.1 % (ref 35–45)
HDLC SERPL-MCNC: 60 MG/DL (ref 40–60)
HDLC SERPL: 4.1 {RATIO} (ref 0–5)
HGB BLD-MCNC: 12.2 G/DL (ref 12–16)
LDLC SERPL CALC-MCNC: 162 MG/DL (ref 0–100)
LIPID PROFILE,FLP: ABNORMAL
LYMPHOCYTES # BLD: 1.4 K/UL (ref 0.9–3.6)
LYMPHOCYTES NFR BLD: 20 % (ref 21–52)
MCH RBC QN AUTO: 28.8 PG (ref 24–34)
MCHC RBC AUTO-ENTMCNC: 32 G/DL (ref 31–37)
MCV RBC AUTO: 90.1 FL (ref 74–97)
MONOCYTES # BLD: 0.4 K/UL (ref 0.05–1.2)
MONOCYTES NFR BLD: 6 % (ref 3–10)
NEUTS SEG # BLD: 5.3 K/UL (ref 1.8–8)
NEUTS SEG NFR BLD: 72 % (ref 40–73)
PLATELET # BLD AUTO: 245 K/UL (ref 135–420)
PMV BLD AUTO: 11.5 FL (ref 9.2–11.8)
POTASSIUM SERPL-SCNC: 4.8 MMOL/L (ref 3.5–5.5)
PROT SERPL-MCNC: 6.1 G/DL (ref 6.4–8.2)
RBC # BLD AUTO: 4.23 M/UL (ref 4.2–5.3)
SODIUM SERPL-SCNC: 142 MMOL/L (ref 136–145)
T4 FREE SERPL-MCNC: 1.2 NG/DL (ref 0.7–1.5)
TRIGL SERPL-MCNC: 110 MG/DL (ref ?–150)
TSH SERPL DL<=0.05 MIU/L-ACNC: 2.9 UIU/ML (ref 0.36–3.74)
VLDLC SERPL CALC-MCNC: 22 MG/DL
WBC # BLD AUTO: 7.4 K/UL (ref 4.6–13.2)

## 2018-12-17 PROCEDURE — 85025 COMPLETE CBC W/AUTO DIFF WBC: CPT

## 2018-12-17 PROCEDURE — 84439 ASSAY OF FREE THYROXINE: CPT

## 2018-12-17 PROCEDURE — 83880 ASSAY OF NATRIURETIC PEPTIDE: CPT

## 2018-12-17 PROCEDURE — 80061 LIPID PANEL: CPT

## 2018-12-17 PROCEDURE — 36415 COLL VENOUS BLD VENIPUNCTURE: CPT

## 2018-12-17 PROCEDURE — 84443 ASSAY THYROID STIM HORMONE: CPT

## 2018-12-17 PROCEDURE — 80053 COMPREHEN METABOLIC PANEL: CPT

## 2018-12-17 RX ORDER — LEVOTHYROXINE SODIUM 100 UG/1
100 TABLET ORAL DAILY
Qty: 90 TAB | Refills: 3 | Status: SHIPPED | OUTPATIENT
Start: 2018-12-17 | End: 2019-11-19 | Stop reason: SDUPTHER

## 2018-12-17 RX ORDER — LISINOPRIL 40 MG/1
40 TABLET ORAL DAILY
Qty: 90 TAB | Refills: 3 | Status: SHIPPED | OUTPATIENT
Start: 2018-12-17 | End: 2019-11-19 | Stop reason: SDUPTHER

## 2018-12-17 NOTE — PROGRESS NOTES
Catrachito Antonio presents today for   Chief Complaint   Patient presents with    Annual Wellness Visit    Medication Refill       Is someone accompanying this pt? no    Is the patient using any DME equipment during OV? Yes walker    Depression Screening:  PHQ over the last two weeks 4/17/2018   Little interest or pleasure in doing things Not at all   Feeling down, depressed, irritable, or hopeless Not at all   Total Score PHQ 2 0       Learning Assessment:  Learning Assessment 12/13/2018   PRIMARY LEARNER Patient   HIGHEST LEVEL OF EDUCATION - PRIMARY LEARNER  GRADUATED HIGH SCHOOL OR GED   BARRIERS PRIMARY LEARNER NONE   CO-LEARNER CAREGIVER No   PRIMARY LANGUAGE ENGLISH   LEARNER PREFERENCE PRIMARY LISTENING   ANSWERED BY self   RELATIONSHIP SELF       Abuse Screening:  Abuse Screening Questionnaire 7/17/2018   Do you ever feel afraid of your partner? N   Are you in a relationship with someone who physically or mentally threatens you? N   Is it safe for you to go home? Y           Health Maintenance Due   Topic Date Due    Shingrix Vaccine Age 49> (1 of 2) 01/28/1983    GLAUCOMA SCREENING Q2Y  05/21/2018    MEDICARE YEARLY EXAM  08/02/2018   . Health Maintenance reviewed and discussed and ordered per Provider. Catrachito Antonio is updated on all       Coordination of Care:  1. Have you been to the ER, urgent care clinic since your last visit? Hospitalized since your last visit? no    2. Have you seen or consulted any other health care providers outside of the [address] [place] [address] since your last visit? Include any pap smears or colon screening. no    Per-Dr. Jaclyn nava medication not taking to be deleted. Advance Directive:  1. Do you have an advance directive in place? Patient Reply:no    2. If not, would you like material regarding how to put one in place?  Patient Reply: no

## 2018-12-17 NOTE — PROGRESS NOTES
Internal Medicine  Subsequent Annual Wellness Visit (AWV) [facility]  [address] [place] [address], [place], [address]  Phone [phone]; Fax [phone]    Date of Service:  2018  Patient's Name & :   Alix Bailey - 1933   Patient's PCP:  Jo Ann Perea, NP   Health Maintenance Status     Health Maintenance   Topic Date Due    Shingrix Vaccine Age 49> (1 of 2) 1983    GLAUCOMA SCREENING Q2Y  2018    MEDICARE YEARLY EXAM  2018    Influenza Age 5 to Adult  2019 (Originally 2018)    DTaP/Tdap/Td series (3 - Td) 08/15/2026    Bone Densitometry (Dexa) Screening  Addressed    Pneumococcal 65+ Low/Medium Risk  Addressed       HPI, Assessment, Orders      Erica Agrawal is a 80 y.o. patient who was seen today for a Subsequent Medicare Wellness Visit. She  has a past medical history of Arthritis of knee, CAD (coronary artery disease), Glaucoma, HTN (hypertension), Hyperlipidemia, Hypertensive cardiovascular disease, Hypothyroidism, Osteopenia, and Prediabetes. She also has stage 3 CKD. Education and counseling provided regarding age, gender and functional status appropriate HM and screening issues -  discussed with the patient, including CMS covered intervals for screening. Written 5-year personalized preventive services plan and information about advance care planning (ACP) provided to patient today. Also here to follow up BP today. At last visit she was taking Lisinopril 40mg daily and amlodipine/benazepril 5mg/20mg daily and her BP was high on those meds. She was somehow taking 2 ACE-I which is not recommended so at last visit I continued Lisinopril 40mg daily and stopped the amlodipine/benazepril 5mg/20mg and changed it to Amlodipine 10mg daily and was advised to return today with BP logs -to see how her BP is doing 5 days after starting the new medication.  She reports today that she did not start the amlodipine 10mg daily and, is instead, taking amlodpine/benazepril 5mg/20mg daily and Lisinopril 40mg daily (her previous regimen) because she was not sure of the instructions and which one to take. She did  the new amlodipine 10mg Rx from the pharmacy. She states her pneumococcal vaccine are UTD -she has had 2 pneumococcal shots  She declines flu shot - risks and benefits of declining flu shot including hospitalization and death discussed - she still refuses flu shot  She never had shingles vaccine  Never had DEXA  Lives with son and son-in-law, ambulates with walker, no falls. Declines mammogram - no fam h/o breast cancer    No orders of the defined types were placed in this encounter. Orders Placed This Encounter    DEXA BONE DENSITY STUDY AXIAL     Standing Status:   Future     Standing Expiration Date:   1/17/2020     Order Specific Question:   Reason for Exam     Answer:   screen osteoporosis    [facility] MAMMO BI SCREENING INCL CAD     Standing Status:   Future     Standing Expiration Date:   1/17/2020     Order Specific Question:   Reason for Exam     Answer:   screen for breast cancer    HEMOGLOBIN A1C WITH EAG     Standing Status:   Future     Standing Expiration Date:   12/18/2019    NT-PRO BNP     Standing Status:   Future     Standing Expiration Date:   12/18/2019    MICROALBUMIN, UR, RAND W/ MICROALB/CREAT RATIO     Standing Status:   Future     Standing Expiration Date:   12/18/2019    LIPID PANEL     Standing Status:   Future     Standing Expiration Date:   12/18/2019    OCCULT BLOOD IMMUNOASSAY,DIAGNOSTIC     Standing Status:   Future     Standing Expiration Date:   12/18/2019    REFERRAL TO CARDIOLOGY     Referral Priority:   Routine     Referral Type:   Consultation     Referral Reason:   Specialty Services Required     Referred to Provider:   Fortino Solorzano MD     Requested Specialty:   Cardiology     Number of Visits Requested:   1       John Mcdaniel.  Palomo Serna MD, [facility]   Internal Medicine/Pediatrics  12/17/2018, 9:35 AM    I have reviewed the patient's medical history and current medications in detail and updated the computerized patient record. Patient Active Problem List    Diagnosis    Stage 3 chronic kidney disease ([facility] [address]     Stage 3B      ACP (advance care planning)     Discussed in detail [address] with patient. Patient is undecided at the present time and handouts given for pt to decide on disposition.          Prediabetes    Arthritis of knee    Arthritis of back    Chronic edema    Hyperlipidemia    Hypertension    Hypothyroidism       Review of Systems   Review of Systems (positives in bold)   General ROS:  Denies chills, fatigue, fever, hot flashes, malaise, night sweats, sleep disturbance, weight gain, weight loss  Neurological ROS: Denies behavioral changes, bowel and bladder control changes, confusion, dizziness, gait disturbance, headaches, impaired coordination/balance, memory loss, numbness/tingling, seizures, tremors, visual changes, focal weakness  Ophthalmic ROS: Denies blurry vision, decreased vision, double vision, dry eyes, excessive tearing, eye pain, itchy eyes, loss of vision, photophobia, scotomata, uses contacts or glasses  ENT ROS: Denies epistaxis, hearing change, nasal congestion, nasal discharge, oral lesions, sinus pain, sneezing, sore throat, tinnitus, vertigo, visual changes, vocal changes  Cardiovascular ROS: Denies: chest pain, dyspnea on exertion, + edema, Denies irregular heartbeat, loss of consciousness, murmur, orthopnea, palpitations, paroxysmal nocturnal dyspnea, rapid heart rate, shortness of breath  Respiratory ROS: Denies cough, hemoptysis, orthopnea, pleuritic pain, shortness of breath, sputum changes, stridor, tachypnea, wheezing  Gastrointestinal ROS: Denies abdominal pain, appetite loss, blood in stools, change in bowel habits, constipation, diarrhea, gas/bloating, heartburn, hematemesis, melena, nausea/vomiting, stool incontinence, swallowing difficulty/pain, early satiety  Genito-Urinary ROS: Denies dysuria, urinary frequency, urinary urgency, urinary incontinence, hematuria, malodorous urine, flank pain, genital discharge, genital ulcers,  pelvic pain,   Endocrine ROS: Denies hair pattern changes, hot flashes, malaise/lethargy, mood swings, palpitations, polydipsia/polyuria, skin changes, temperature intolerance, unexplained weight changes  Breast ROS: Denies breast changes, galactorrhea, new or changing breast lumps, nipple changes, nipple discharge, skin changes on breast  Dermatological ROS: Denies acne, dry skin, eczema, hair changes, lumps, mole changes, nail changes, pruritus, rash, skin lesion changes  Musculoskeletal ROS: Denies gait disturbance, joint pain, joint stiffness, joint swelling, muscle pain, muscular weakness, joint buckling/instability, joint triggering, neck pain, back pain  Hematological and Lymphatic ROS: Denies bleeding problems, blood clots, bruising, fatigue, jaundice, night sweats, swollen lymph nodes, unexplained weight loss  Allergy and Immunology ROS: Denies hives, itchy/watery eyes, nasal congestion, postnasal drip, seasonal allergies  Psychological ROS: Denies anxiety, behavioral disorder, concentration difficulties, decreased libido, depression, disorientation, hallucinations, irritability, mood swings, obsessive thoughts, sleep disturbances, suicidal ideation, homicidal ideation      Social History     Social History     Socioeconomic History    Marital status:      Spouse name: Not on file    Number of children: Not on file    Years of education: Not on file    Highest education level: Not on file   Tobacco Use    Smoking status: Never Smoker    Smokeless tobacco: Never Used   Substance and Sexual Activity    Alcohol use: No       Living situation:   -- Lives with family - lives with son and son-in-law who take her to her appts.    -- Stairs in home: Yes, ambulates with walker, no falls. She stays in a room that is downstairs so she does not climb stairs    Diet, Lifestyle: follows a diabetic diet regularly - attempting to follow low carb diet. Does not eat sweets. Limits herself to 2 pieces of bread per day. Not exercising much due to her joint pains. Drinks mostly water, very little soda. No juices. Depression Risk Screen     PHQ over the last two weeks 4/17/2018   Little interest or pleasure in doing things Not at all   Feeling down, depressed, irritable, or hopeless Not at all   Total Score PHQ 2 0       Alcohol Risk Screen   You do not drink alcohol or very rarely. Functional Ability and Level of Safety   No exam data present     Hearing Loss   Hearing is good. Patient feels hearing is adequate, declines audiology evaluation    Activities of Daily Living   Self-care: does personal hygeine/bathing, personal finances, laundry, cooking, cleaning all on her own  Her son and son-in-law only help with the grocery shopping since her ambulation is limited    Fall Risk Screen     Fall Risk Assessment, last 12 mths 4/17/2018   Able to walk? Yes   Fall in past 12 months? No       Abuse Screen   Patient is not abused    Physical Examination   There were no vitals taken for this visit. Visit Vitals  /76 (BP 1 Location: Left arm, BP Patient Position: Sitting)   Pulse 64   Temp 97.6 °F (36.4 °C) (Oral)   Resp 18   Ht 5' 5\" (1.651 m)   Wt 194 lb (88 kg)   BMI 32.28 kg/m²     Well-appearing, well-groomed, well-nourished age appropriate female seated comfortably in exam room chair. Ambulating with use of rolling walker    General:  Alert, cooperative, no distress, appears stated age. Head:  Normocephalic, without obvious abnormality, atraumatic. Eyes:  Conjunctivae/corneas clear. PERRL, EOMs intact. Fundi benign. Ears:  Normal TMs and external ear canals both ears. Nose: Nares normal. Septum midline. Mucosa normal. No drainage or sinus tenderness.    Throat: Lips, mucosa, and tongue normal. Teeth and gums normal.   Neck: Supple, symmetrical, trachea midline, no adenopathy, thyroid: no enlargement/tenderness/nodules, no carotid bruit and no JVD. Back:   Symmetric, no curvature. ROM normal. No CVA tenderness. Lungs:   Clear to auscultation bilaterally. Chest wall:  No tenderness or deformity. Heart:  Regular rate and rhythm, S1, S2 normal, no murmur, click, rub or gallop. Abdomen:   Soft, non-tender. Bowel sounds normal. No masses,  No organomegaly. Extremities: Extremities normal, atraumatic, no cyanosis 2+ stable edema B/L LE. 1+ Pedal pulses B/L. Onychomycosis on toenails and some skin redness between toes. Pulses: 2+ and symmetric all extremities. Skin: Skin color, texture, turgor normal. No rashes or lesions. Lymph nodes: Cervical and supraclavicular nodes normal.   Neurologic: CNII-XII grossly intact. Normal reflexes throughout. PT DECLINES BREAST EXAM - RISKS OF NOT EXAMINING EXPLAINED   EXAM NOT INDICATED DUE TO ADVANCED AGE     Advance Care Planning - 12/17/2018     Date of ACP Conversation:  12/17/2018       Persons included in [place]:  Patient, wishes to have her son Vanessa Sanders by [employer] (does not have any legal paperwork completed)    Length of ACP Conversation in minutes:  <16 minutes (Non-Billable)         Discussed advance care planning documents/advance care directives with patient today including exploration of values, goals, and preferences if recovery is not expected, even with continued medical treatment (such as in the event of:  Imminent death, Severe/permanent brain injury). In those circumstances, patient states what matters most to them is:  Care focused more on quantity (length) of life. Patient has NOT already completed an ACP document. Patient states she wishes to be FULL CODE.    However, she states she does not wish to have life prolonging interventions (including feeding tubes, resuscitation or CPR, or ventilation/respiratory assistance) in the event of medically futile care, terminal illness/no chance for recovery, brain death or persistent vegetative state. Patient wishes to designate her son Jacques Carpenter as her medical proxy in the event she is unable to make her own medical decisions. Patient has not yet completed an ACP document. Provided written information re: ACP documents today including [place] advance directive form. Recommended completion of Advance Directive form after review of ACP materials and conversation with prospective healthcare agent. Recommended communicating the plan and making copies for the healthcare agent, personal physician, and others as appropriate. Past Medical History:   Diagnosis Date    Arthritis of knee 2/17/2012    CAD (coronary artery disease)     Glaucoma     HTN (hypertension)     Hyperlipidemia     Hypertensive cardiovascular disease     Hypothyroidism     Osteopenia     Prediabetes 3/15/2013     No past surgical history on file. Current Outpatient Medications on File Prior to Visit   Medication Sig Dispense Refill    lisinopril (PRINIVIL, ZESTRIL) 40 mg tablet Take 1 Tab by mouth daily. 90 Tab 3    levothyroxine (SYNTHROID) 100 mcg tablet Take 1 Tab by mouth daily. 90 Tab 3    amLODIPine (NORVASC) 10 mg tablet Take 1 Tab by mouth daily. 90 Tab 3    diclofenac (VOLTAREN) 1 % gel Apply 4 g to affected area four (4) times daily. 100 g 0    acetaminophen (TYLENOL) 650 mg CR tablet Take 650 mg by mouth every six (6) hours as needed for Pain.  timolol (TIMOPTIC-XR) 0.5 % ophthalmic gel-forming Administer 1 Drop to right eye daily.  travoprost (TRAVATAN) 0.004 % ophthalmic solution Administer 1 Drop to right eye every evening.  DOCOSAHEXANOIC ACID/EPA (FISH OIL PO) Take  by mouth two (2) times a day.       prednisoLONE acetate (PRED FORTE) 1 % ophthalmic suspension Administer 1 Drop to left eye three (3) times daily. No current facility-administered medications on file prior to visit. Allergies   Allergen Reactions    Lipitor [Atorvastatin] Other (comments)     Leg aches      Zocor [Simvastatin] Other (comments)     Leg aches       Patient Care Team:  Cinthia Holloway NP as PCP - General (Nurse Practitioner)        Assessment and Plan:      ICD-10-CM ICD-9-CM    1. Encounter for initial preventive physical examination covered by Medicare Z00.00 V70.0 HEMOGLOBIN A1C WITH EAG      NT-PRO BNP      MICROALBUMIN, UR, RAND W/ MICROALB/CREAT RATIO      LIPID PANEL      OCCULT BLOOD IMMUNOASSAY,DIAGNOSTIC   2. Prediabetes R73.03 790.29 HEMOGLOBIN A1C WITH EAG   3. Chronic edema R60.9 782.3 NT-PRO BNP      DUPLEX LOWER EXT VENOUS BILAT      REFERRAL TO CARDIOLOGY   4. Stage 3 chronic kidney disease (HCC) N18.3 585.3 MICROALBUMIN, UR, RAND W/ MICROALB/CREAT RATIO   5. Mixed hyperlipidemia E78.2 272.2 LIPID PANEL   6. Screen for colon cancer Z12.11 V76.51 OCCULT BLOOD IMMUNOASSAY,DIAGNOSTIC   7. Screening for osteoporosis Z13.820 V82.81 DEXA BONE DENSITY STUDY AXIAL   8. Screening for breast cancer Z12.31 V76.10 [facility] MAMMO BI SCREENING INCL CAD   9. Hypothyroidism, unspecified type E03.9 244.9 DEXA BONE DENSITY STUDY AXIAL      levothyroxine (SYNTHROID) 100 mcg tablet   10. Essential hypertension I10 401.9 lisinopril (PRINIVIL, ZESTRIL) 40 mg tablet     Plan:    1. MEDICARE PHYSICAL  79 y/o Here for Medicare Physical  Tdap UTD  Pt advised of need for shingles vaccination with new 2 dose shingrix. Pt aware that this can be requested at any retail pharmacy. risks and benefits discussed, denies prior vaccine reactions  Flu Vaccine: Declines, risks of refusal including hospitalization and death discussed  Pneumonia vaccine: UTD for both Prevnar and pneumovax  DEXA ref given  EKG recently done on 12/13/18, cardiology ref given since she has edema - will likely need echo.    Mammogram ref offered - discussed risks/benefits of mammogram at her age, she declines mammogram  Given her foot exam which shows 1+ pulses and some redness between her toes with no obvious open sores, I have advised her to see podiatry - ref given      2. PRE-DM  Check A1c and CMP, UA, microalbumin and labs as per lab orders    3. EDEMA  Check B/L LE Doppler US to r/o DVT - I recommended same-day US  EKG done 5 days ago and WNL for age but I did give ref for cardiology for echo    4. STAGE 3 CKD  stable  Check CMP  I recommended urine microalbumin and UA today but pt unable to void - can try checking this at next visit    5. HLD  Check fasting lipid panel, CMP with labs as per lab orders  Last LDL was high at 186 but she is allergic to statins and cannot take them. 6. SCREEN COLON  FIT kit given  Colonoscopy not indicated due to advanced age. 7. SCREEN OSTEOPOROSIS  Last DEXA ref given in 2010 but it looks like she never went, do not see report in system and she states she never had a DEXA  DEXA Advised but pt declines - I did give referral in the case she changes her mind. Risks of declining DEXA discussed    8. SCREEN BREAST CANCER  Discussed pros and cons of mammo at her advanced age - she declines any further mammograms    9.  HYPOTHYROIDISM  Continue current dose of levothyroxine 100mcg daily  Check TFTs with labs  Refilled levothyroxine today

## 2018-12-17 NOTE — PATIENT INSTRUCTIONS
Medicare Wellness Visits: The best way to stay healthy is to live a healthy lifestyle. A healthy lifestyle includes regular exercise, eating a well-balanced diet, keeping a healthy weight and not smoking. Regular preventive visits with your doctor (not to address an illness or problem, but with a goal of screening and prevention) are another important way to take care of yourself. Preventive exams provided by health care providers can find health problems early when treatment works best and can keep you from getting certain diseases or illnesses. Preventive services include exams, lab tests, screenings, shots, monitoring and information to help you take care of your own health. RECOMMENDED FOR EVERYONE:    IMMUNIZATIONS:  All people over 72 should have a pneumonia shot. Pneumonia shots are usually only needed once in a lifetime unless your doctor decides differently. All people over 65 should have a yearly flu shot. People over 65 are at medium to high risk for Hepatitis B. Three shots are needed for complete protection. Some screening tests are also recommended:    Glaucoma is an eye disease caused by high pressure in the eye. An eye exam is recommended every year. Diabetes Mellitus screening is recommended every year. Medicare covers a screening glucose (blood sugar level). If that test is abnormal, additional testing will be covered. Cardiovascular screening tests that check your cholesterol and other blood fat (lipid) levels are recommended at least every five years, more frequently if you have elevated lipid levels. Colorectal Cancer screening tests help to find pre-cancerous polyps (growths in the colon) so they can be removed before they turn into cancer. Tests ordered for screening depend on your personal and family history risk factors. ADVANCE DIRECTIVES  Consider completing an advance directive while you are feeling well and sound of mind.   I recommend discussing these documents with family members and anyone you are selecting to assist with your medical decisions in advance. I also recommend you provide me with a copy of any advance directives you may have completed so I can add it to your medical chart. A living will allows you to document your wishes concerning medical treatments at the end of life. A medical power of  (or healthcare proxy) allows you to appoint a person you trust as your healthcare agent (or surrogate decision maker), who is authorized to make medical decisions on your behalf. RECOMMENDED FOR FEMALES:    Bone mass measurement (dexa scan) is recommended every two years for women over the age of 72 and for some women at an earlier age (postmenopausal women with additional risk factors. Screening for breast cancer is recommended yearly with a Mammogram and is covered by Medicare. Screening for cervical and vaginal cancer is recommended with a pelvic and Pap test every 2-5 years depending on your risk factors. Women after the age of 72 do not need a Pap test, but still should have pelvic testing. However if you have had an abnormal pap in the past  three years or at high risk for cervical or vaginal cancer Medicare will cover a pap test and a pelvic exam every year.      RECOMMENDED FOR MALES:    Prostate Cancer Screening (annually up to age 76)     5-year personalized preventive services plan:  Health Maintenance   Topic Date Due    Shingrix Vaccine Age 49> (1 of 2) 01/28/1983    GLAUCOMA SCREENING Q2Y  05/21/2018    MEDICARE YEARLY EXAM  08/02/2018    Influenza Age 9 to Adult  05/27/2019 (Originally 8/1/2018)    DTaP/Tdap/Td series (3 - Td) 08/15/2026    Bone Densitometry (Dexa) Screening  Addressed    Pneumococcal 65+ Low/Medium Risk  Addressed            High Blood Pressure: Care Instructions  Your Care Instructions    If your blood pressure is usually above 130/80, you have high blood pressure, or hypertension. That means the top number is 130 or higher or the bottom number is 80 or higher, or both. Despite what a lot of people think, high blood pressure usually doesn't cause headaches or make you feel dizzy or lightheaded. It usually has no symptoms. But it does increase your risk for heart attack, stroke, and kidney or eye damage. The higher your blood pressure, the more your risk increases. Your doctor will give you a goal for your blood pressure. Your goal will be based on your health and your age. Lifestyle changes, such as eating healthy and being active, are always important to help lower blood pressure. You might also take medicine to reach your blood pressure goal.  Follow-up care is a key part of your treatment and safety. Be sure to make and go to all appointments, and call your doctor if you are having problems. It's also a good idea to know your test results and keep a list of the medicines you take. How can you care for yourself at home? Medical treatment  · If you stop taking your medicine, your blood pressure will go back up. You may take one or more types of medicine to lower your blood pressure. Be safe with medicines. Take your medicine exactly as prescribed. Call your doctor if you think you are having a problem with your medicine. · Talk to your doctor before you start taking aspirin every day. Aspirin can help certain people lower their risk of a heart attack or stroke. But taking aspirin isn't right for everyone, because it can cause serious bleeding. · See your doctor regularly. You may need to see the doctor more often at first or until your blood pressure comes down. · If you are taking blood pressure medicine, talk to your doctor before you take decongestants or anti-inflammatory medicine, such as ibuprofen. Some of these medicines can raise blood pressure. · Learn how to check your blood pressure at home. Lifestyle changes  · Stay at a healthy weight.  This is especially important if you put on weight around the waist. Losing even 10 pounds can help you lower your blood pressure. · If your doctor recommends it, get more exercise. Walking is a good choice. Bit by bit, increase the amount you walk every day. Try for at least 30 minutes on most days of the week. You also may want to swim, bike, or do other activities. · Avoid or limit alcohol. Talk to your doctor about whether you can drink any alcohol. · Try to limit how much sodium you eat to less than 2,300 milligrams (mg) a day. Your doctor may ask you to try to eat less than 1,500 mg a day. · Eat plenty of fruits (such as bananas and oranges), vegetables, legumes, whole grains, and low-fat dairy products. · Lower the amount of saturated fat in your diet. Saturated fat is found in animal products such as milk, cheese, and meat. Limiting these foods may help you lose weight and also lower your risk for heart disease. · Do not smoke. Smoking increases your risk for heart attack and stroke. If you need help quitting, talk to your doctor about stop-smoking programs and medicines. These can increase your chances of quitting for good. When should you call for help? Call 911 anytime you think you may need emergency care. This may mean having symptoms that suggest that your blood pressure is causing a serious heart or blood vessel problem. Your blood pressure may be over 180/120.   For example, call 911 if:    · You have symptoms of a heart attack. These may include:  ? Chest pain or pressure, or a strange feeling in the chest.  ? Sweating. ? Shortness of breath. ? Nausea or vomiting. ? Pain, pressure, or a strange feeling in the back, neck, jaw, or upper belly or in one or both shoulders or arms. ? Lightheadedness or sudden weakness. ? A fast or irregular heartbeat.     · You have symptoms of a stroke.  These may include:  ? Sudden numbness, tingling, weakness, or loss of movement in your face, arm, or leg, especially on only one side of your body. ? Sudden vision changes. ? Sudden trouble speaking. ? Sudden confusion or trouble understanding simple statements. ? Sudden problems with walking or balance. ? A sudden, severe headache that is different from past headaches.     · You have severe back or belly pain.    Do not wait until your blood pressure comes down on its own. Get help right away.   Call your doctor now or seek immediate care if:    · Your blood pressure is much higher than normal (such as 180/120 or higher), but you don't have symptoms.     · You think high blood pressure is causing symptoms, such as:  ? Severe headache.  ? Blurry vision.    Watch closely for changes in your health, and be sure to contact your doctor if:    · Your blood pressure measures higher than your doctor recommends at least 2 times. That means the top number is higher or the bottom number is higher, or both.     · You think you may be having side effects from your blood pressure medicine. Where can you learn more? Go to http://mila-jamie.info/. Enter D831 in the search box to learn more about \"High Blood Pressure: Care Instructions. \"  Current as of: December 6, 2017  Content Version: 11.8  © 3132-3832 [employer]. Care instructions adapted under license by "[employer]" (which disclaims liability or warranty for this information). If you have questions about a medical condition or this instruction, always ask your healthcare professional. [place] [address] any warranty or liability for your use of this information.

## 2018-12-18 ENCOUNTER — TELEPHONE (OUTPATIENT)
Dept: FAMILY MEDICINE CLINIC | Age: 83
End: 2018-12-18

## 2018-12-18 NOTE — PROGRESS NOTES
Inform pt that her Electrolytes and liver function are normal. Her kidney function is abnormal but expected for her age and stable. Her thyroid is normal so continue same medications. Blood test for heart failure (done to eval edema) was normal/negative. Total cholesterol and LDL (bad chol) are high but better than previous so I recommend continuing low saturated fat diet since she is allergic to statins. Her Complete blood count is normal. She may proceed with her eye surgery as planned. Please fax pre-op note, lab results, EKG to her ophthalmologist (name and fax # is in the pre-op note).

## 2018-12-18 NOTE — TELEPHONE ENCOUNTER
Scheduling called and said that this patient does not want to have her doppler test done with [facility] she didn't say where she was having it.

## 2018-12-20 ENCOUNTER — TELEPHONE (OUTPATIENT)
Dept: FAMILY MEDICINE CLINIC | Age: 83
End: 2018-12-20

## 2018-12-20 NOTE — TELEPHONE ENCOUNTER
Called patient and no answer. Left a message stating per-order Dr. Timmy Sánchez ok to go on with surgery.

## 2018-12-20 NOTE — TELEPHONE ENCOUNTER
----- Message from Tali Moore MD sent at 12/18/2018  9:08 AM EST -----  Inform pt that her Electrolytes and liver function are normal. Her kidney function is abnormal but expected for her age and stable. Her thyroid is normal so continue same medications. Blood test for heart failure (done to eval edema) was normal/negative. Total cholesterol and LDL (bad chol) are high but better than previous so I recommend continuing low saturated fat diet since she is allergic to statins. Her Complete blood count is normal. She may proceed with her eye surgery as planned. Please fax pre-op note, lab results, EKG to her ophthalmologist (name and fax # is in the pre-op note).

## 2019-11-19 ENCOUNTER — OFFICE VISIT (OUTPATIENT)
Dept: FAMILY MEDICINE CLINIC | Age: 84
End: 2019-11-19

## 2019-11-19 VITALS
RESPIRATION RATE: 16 BRPM | WEIGHT: 202.4 LBS | HEIGHT: 65 IN | BODY MASS INDEX: 33.72 KG/M2 | SYSTOLIC BLOOD PRESSURE: 138 MMHG | DIASTOLIC BLOOD PRESSURE: 64 MMHG | OXYGEN SATURATION: 100 % | TEMPERATURE: 97.8 F | HEART RATE: 59 BPM

## 2019-11-19 DIAGNOSIS — I10 ESSENTIAL HYPERTENSION: ICD-10-CM

## 2019-11-19 DIAGNOSIS — E78.2 MIXED HYPERLIPIDEMIA: ICD-10-CM

## 2019-11-19 DIAGNOSIS — R73.03 PREDIABETES: Primary | ICD-10-CM

## 2019-11-19 DIAGNOSIS — D22.9 ATYPICAL NEVI: ICD-10-CM

## 2019-11-19 DIAGNOSIS — E03.9 HYPOTHYROIDISM, UNSPECIFIED TYPE: ICD-10-CM

## 2019-11-19 RX ORDER — LEVOTHYROXINE SODIUM 100 UG/1
100 TABLET ORAL DAILY
Qty: 90 TAB | Refills: 0 | Status: SHIPPED | OUTPATIENT
Start: 2019-11-19 | End: 2020-04-07

## 2019-11-19 RX ORDER — AMLODIPINE BESYLATE 10 MG/1
10 TABLET ORAL DAILY
Qty: 90 TAB | Refills: 1 | Status: SHIPPED | OUTPATIENT
Start: 2019-11-19 | End: 2020-06-03 | Stop reason: SDUPTHER

## 2019-11-19 RX ORDER — LISINOPRIL 40 MG/1
40 TABLET ORAL DAILY
Qty: 90 TAB | Refills: 1 | Status: SHIPPED | OUTPATIENT
Start: 2019-11-19 | End: 2020-06-03 | Stop reason: SDUPTHER

## 2019-11-19 RX ORDER — LEVOTHYROXINE SODIUM 100 UG/1
112 TABLET ORAL DAILY
Qty: 90 TAB | Refills: 1 | Status: CANCELLED | OUTPATIENT
Start: 2019-11-19

## 2019-11-19 RX ORDER — ASPIRIN 81 MG/1
TABLET ORAL DAILY
COMMUNITY

## 2019-11-19 NOTE — PROGRESS NOTES
HPI  Edwina Nesbitt is a 80 y.o. female  Chief Complaint   Patient presents with    Hypertension    Cholesterol Problem     high chol    Hypothyroidism   Patient states she is just here for follow-up, medication refills, and any labs that she needs. Patient says that she does not want anything extra. She is aware of the health maintenance that is due and she adamantly declines these items. She states she has no new concerns and she is well. She reports she takes her thyroid medications as prescribed. She does admit to a mole that just appeared under her eye when asked about this. She states this does not bother her and she just keeps this covered with make-up. She admits the area is growing. Past Medical History  Past Medical History:   Diagnosis Date    Arthritis of knee 2/17/2012    CAD (coronary artery disease)     Glaucoma     HTN (hypertension)     Hyperlipidemia     Hypertensive cardiovascular disease     Hypothyroidism     Osteopenia     Prediabetes 3/15/2013       Surgical History  No past surgical history on file. Medications  Current Outpatient Medications   Medication Sig Dispense Refill    aspirin delayed-release 81 mg tablet Take  by mouth daily.  amLODIPine (NORVASC) 10 mg tablet Take 1 Tab by mouth daily. Indications: high blood pressure 90 Tab 1    lisinopril (PRINIVIL, ZESTRIL) 40 mg tablet Take 1 Tab by mouth daily. 90 Tab 1    levothyroxine (SYNTHROID) 100 mcg tablet Take 1 Tab by mouth daily. 90 Tab 0    diclofenac (VOLTAREN) 1 % gel Apply 4 g to affected area four (4) times daily. 100 g 0    acetaminophen (TYLENOL) 650 mg CR tablet Take 650 mg by mouth every six (6) hours as needed for Pain.  timolol (TIMOPTIC-XR) 0.5 % ophthalmic gel-forming Administer 1 Drop to right eye daily.  travoprost (TRAVATAN) 0.004 % ophthalmic solution Administer 1 Drop to right eye every evening.       DOCOSAHEXANOIC ACID/EPA (FISH OIL PO) Take  by mouth two (2) times a day.      prednisoLONE acetate (PRED FORTE) 1 % ophthalmic suspension Administer 1 Drop to left eye three (3) times daily. Allergies  Allergies   Allergen Reactions    Lipitor [Atorvastatin] Other (comments)     Leg aches      Zocor [Simvastatin] Other (comments)     Leg aches         Family History  No family history on file.     Social History  Social History     Socioeconomic History    Marital status:      Spouse name: Not on file    Number of children: Not on file    Years of education: Not on file    Highest education level: Not on file   Occupational History    Not on file   Social Needs    Financial resource strain: Not on file    Food insecurity:     Worry: Not on file     Inability: Not on file    Transportation needs:     Medical: Not on file     Non-medical: Not on file   Tobacco Use    Smoking status: Never Smoker    Smokeless tobacco: Never Used   Substance and Sexual Activity    Alcohol use: No    Drug use: Not on file    Sexual activity: Not on file   Lifestyle    Physical activity:     Days per week: Not on file     Minutes per session: Not on file    Stress: Not on file   Relationships    Social connections:     Talks on phone: Not on file     Gets together: Not on file     Attends Alevism service: Not on file     Active member of club or organization: Not on file     Attends meetings of clubs or organizations: Not on file     Relationship status: Not on file    Intimate partner violence:     Fear of current or ex partner: Not on file     Emotionally abused: Not on file     Physically abused: Not on file     Forced sexual activity: Not on file   Other Topics Concern    Not on file   Social History Narrative    Not on file       Problem List  Patient Active Problem List   Diagnosis Code    Hyperlipidemia E78.5    Hypertension I10    Hypothyroidism E03.9    Arthritis of knee M17.10    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 Respiratory: Negative for shortness of breath. Cardiovascular: Positive for leg swelling. Negative for chest pain. Gastrointestinal: Negative for abdominal pain, nausea and vomiting. Genitourinary: Negative. Musculoskeletal: Negative for falls. Skin: Positive for itching. Neurological: Negative for dizziness. Psychiatric/Behavioral: Negative for depression. Vital Signs  Vitals:    11/19/19 1354   BP: 138/64   Pulse: (!) 59   Resp: 16   Temp: 97.8 °F (36.6 °C)   TempSrc: Oral   SpO2: 100%   Weight: 202 lb 6.4 oz (91.8 kg)   Height: 5' 5\" (1.651 m)   PainSc:   0 - No pain       Physical Exam  Physical Exam  Neck:      Musculoskeletal: Normal range of motion. Cardiovascular:      Rate and Rhythm: Normal rate and regular rhythm. Pulmonary:      Effort: Pulmonary effort is normal.      Breath sounds: Normal breath sounds. Skin:     General: Skin is warm and dry. Findings: Lesion present. Comments: Large papular lesion with irregular shaped boarders to left upper cheek area just below the eye non-tender. Diagnostics  Orders Placed This Encounter    METABOLIC PANEL, COMPREHENSIVE     Standing Status:   Future     Standing Expiration Date:   11/19/2020    LIPID PANEL     Standing Status:   Future     Standing Expiration Date:   11/19/2020    CBC WITH AUTOMATED DIFF     Standing Status:   Future     Standing Expiration Date:   11/19/2020    TSH 3RD GENERATION     Standing Status:   Future     Standing Expiration Date:   11/19/2020    T4, FREE     Standing Status:   Future     Standing Expiration Date:   5/19/2020    HEMOGLOBIN A1C WITH EAG     Standing Status:   Future     Standing Expiration Date:   11/19/2020    aspirin delayed-release 81 mg tablet     Sig: Take  by mouth daily.     amLODIPine (NORVASC) 10 mg tablet     Sig: Take 1 Tab by mouth daily. Indications: high blood pressure     Dispense:  90 Tab     Refill:  1     Med change    lisinopril (PRINIVIL, ZESTRIL) 40 mg tablet     Sig: Take 1 Tab by mouth daily. Dispense:  90 Tab     Refill:  1    levothyroxine (SYNTHROID) 100 mcg tablet     Sig: Take 1 Tab by mouth daily. Dispense:  90 Tab     Refill:  0       Results  Results for orders placed or performed during the hospital encounter of 12/17/18   CBC WITH AUTOMATED DIFF   Result Value Ref Range    WBC 7.4 4.6 - 13.2 K/uL    RBC 4.23 4.20 - 5.30 M/uL    HGB 12.2 12.0 - 16.0 g/dL    HCT 38.1 35.0 - 45.0 %    MCV 90.1 74.0 - 97.0 FL    MCH 28.8 24.0 - 34.0 PG    MCHC 32.0 31.0 - 37.0 g/dL    RDW 14.4 11.6 - 14.5 %    PLATELET 840 010 - 603 K/uL    MPV 11.5 9.2 - 11.8 FL    NEUTROPHILS 72 40 - 73 %    LYMPHOCYTES 20 (L) 21 - 52 %    MONOCYTES 6 3 - 10 %    EOSINOPHILS 2 0 - 5 %    BASOPHILS 0 0 - 2 %    ABS. NEUTROPHILS 5.3 1.8 - 8.0 K/UL    ABS. LYMPHOCYTES 1.4 0.9 - 3.6 K/UL    ABS. MONOCYTES 0.4 0.05 - 1.2 K/UL    ABS. EOSINOPHILS 0.1 0.0 - 0.4 K/UL    ABS. BASOPHILS 0.0 0.0 - 0.1 K/UL    DF AUTOMATED     METABOLIC PANEL, COMPREHENSIVE   Result Value Ref Range    Sodium 142 136 - 145 mmol/L    Potassium 4.8 3.5 - 5.5 mmol/L    Chloride 112 (H) 100 - 108 mmol/L    CO2 26 21 - 32 mmol/L    Anion gap 4 3.0 - 18 mmol/L    Glucose 96 74 - 99 mg/dL    BUN 23 (H) 7.0 - 18 MG/DL    Creatinine 1.15 0.6 - 1.3 MG/DL    BUN/Creatinine ratio 20 12 - 20      GFR est AA 54 (L) >60 ml/min/1.73m2    GFR est non-AA 45 (L) >60 ml/min/1.73m2    Calcium 8.6 8.5 - 10.1 MG/DL    Bilirubin, total 0.5 0.2 - 1.0 MG/DL    ALT (SGPT) 14 13 - 56 U/L    AST (SGOT) 10 (L) 15 - 37 U/L    Alk.  phosphatase 73 45 - 117 U/L    Protein, total 6.1 (L) 6.4 - 8.2 g/dL    Albumin 3.3 (L) 3.4 - 5.0 g/dL    Globulin 2.8 2.0 - 4.0 g/dL    A-G Ratio 1.2 0.8 - 1.7     TSH 3RD GENERATION   Result Value Ref Range    TSH 2.90 0.36 - 3. 74 uIU/mL   T4, FREE   Result Value Ref Range    T4, Free 1.2 0.7 - 1.5 NG/DL   NT-PRO BNP   Result Value Ref Range    NT pro- 0 - 1,800 PG/ML   LIPID PANEL   Result Value Ref Range    LIPID PROFILE          Cholesterol, total 244 (H) <200 MG/DL    Triglyceride 110 <150 MG/DL    HDL Cholesterol 60 40 - 60 MG/DL    LDL, calculated 162 (H) 0 - 100 MG/DL    VLDL, calculated 22 MG/DL    CHOL/HDL Ratio 4.1 0 - 5.0         Assessment and Plan  Diagnoses and all orders for this visit:    1. Prediabetes  -     TSH 3RD GENERATION; Future  -     T4, FREE; Future  -     HEMOGLOBIN A1C WITH EAG; Future    2. Hypothyroidism, unspecified type  -     METABOLIC PANEL, COMPREHENSIVE; Future  -     LIPID PANEL; Future  -     CBC WITH AUTOMATED DIFF; Future  -     TSH 3RD GENERATION; Future  -     T4, FREE; Future  -     HEMOGLOBIN A1C WITH EAG; Future  -     levothyroxine (SYNTHROID) 100 mcg tablet; Take 1 Tab by mouth daily. 3. Essential hypertension  -     amLODIPine (NORVASC) 10 mg tablet; Take 1 Tab by mouth daily. Indications: high blood pressure  -     lisinopril (PRINIVIL, ZESTRIL) 40 mg tablet; Take 1 Tab by mouth daily.  -     METABOLIC PANEL, COMPREHENSIVE; Future  -     LIPID PANEL; Future  -     CBC WITH AUTOMATED DIFF; Future  -     TSH 3RD GENERATION; Future  -     T4, FREE; Future  -     HEMOGLOBIN A1C WITH EAG; Future    4. Mixed hyperlipidemia  -     METABOLIC PANEL, COMPREHENSIVE; Future  -     LIPID PANEL; Future  -     CBC WITH AUTOMATED DIFF; Future  -     TSH 3RD GENERATION; Future  -     T4, FREE; Future  -     HEMOGLOBIN A1C WITH EAG; Future    5. Atypical nevi    Patient does agree to go for labs. Incidental finding of Nevi on left upper cheek with concerns as this has irregular shaped borders, and has grown in size. I have explained to the patient the need to see dermatology to have this removed.   She understands the risk of this area being malignant/cancerous including basal cell and squamous cell carcinoma. I have explained this to her in detail and she continues to decline. I have also explained to her in detail the importance of health maintenance and patient has declined. She states that she is not interested in any Medicare wellness exams and she is also not interested in any immunizations. Patient has significant swelling to her bilateral extremities and patient has declined any referrals to vascular, nephrology, and or cardiology. She states she will not see any other providers and she verbalizes that she understands the risk of not having treatment. Informed refusal has been signed by the patient. More than 50% of 30 minute visit spent counseling and coordinating care with patient face to face on prediabetes, hypothyroidism, hypertension, health maintenance, needed referrals, atypical nevi, and risk of declining treatment, referrals, and health maintenance including immunizations and Medicare Wellness. After care summary printed and reviewed with patient. Plan reviewed with patient. Questions answered. Patient verbalized understanding of plan and is in agreement with plan. Patient to follow up in three months or earlier if symptoms worsen. Patient declines an earlier followup. Follow-up and Dispositions    · Return in about 3 months (around 2/19/2020), or if symptoms worsen or fail to improve, for rotuine care with PCP.        Joie Dasilva, WHIT

## 2019-11-19 NOTE — PROGRESS NOTES
Zak Mcclelland presents today for   Chief Complaint   Patient presents with    Hypertension    Cholesterol Problem     high chol    Hypothyroidism       Is someone accompanying this pt? no    Is the patient using any DME equipment during OV? no    Depression Screening:  3 most recent PHQ Screens 11/19/2019   Little interest or pleasure in doing things Not at all   Feeling down, depressed, irritable, or hopeless Not at all   Total Score PHQ 2 0       Learning Assessment:  Learning Assessment 11/19/2019   PRIMARY LEARNER Patient   HIGHEST LEVEL OF EDUCATION - PRIMARY LEARNER  GRADUATED HIGH SCHOOL OR GED   BARRIERS PRIMARY LEARNER NONE   CO-LEARNER CAREGIVER No   PRIMARY LANGUAGE ENGLISH    NEED No   LEARNER PREFERENCE PRIMARY DEMONSTRATION   ANSWERED BY patient   RELATIONSHIP SELF       Abuse Screening:  Abuse Screening Questionnaire 11/19/2019   Do you ever feel afraid of your partner? N   Are you in a relationship with someone who physically or mentally threatens you? N   Is it safe for you to go home? Y       Fall Screening  Fall Risk Assessment, last 12 mths 11/19/2019   Able to walk? Yes   Fall in past 12 months? No       Generalized Anxiety  No flowsheet data found. Health Maintenance Due   Topic Date Due    Shingrix Vaccine Age 49> (1 of 2) 01/28/1983    Pneumococcal 65+ years (1 of 1 - PPSV23) 01/28/1998    GLAUCOMA SCREENING Q2Y  05/21/2018    MEDICARE YEARLY EXAM  08/02/2018    Influenza Age 9 to Adult  08/01/2019   . Health Maintenance reviewed and discussed and ordered per Provider. Coordination of Care  1. Have you been to the ER, urgent care clinic since your last visit? Hospitalized since your last visit? no    2. Have you seen or consulted any other health care providers outside of the [address] [place] [address] since your last visit? Include any pap smears or colon screening. no      Advance Directive:  1. Do you have an advance directive in place?  Patient Reply:no    2. If not, would you like material regarding how to put one in place?  Patient Reply: no

## 2020-04-07 DIAGNOSIS — E03.9 HYPOTHYROIDISM, UNSPECIFIED TYPE: ICD-10-CM

## 2020-04-07 RX ORDER — LEVOTHYROXINE SODIUM 100 UG/1
TABLET ORAL
Qty: 90 TAB | Refills: 0 | Status: SHIPPED | OUTPATIENT
Start: 2020-04-07 | End: 2020-06-03 | Stop reason: SDUPTHER

## 2020-04-07 NOTE — TELEPHONE ENCOUNTER
I have only completed a 90 day supply due to Abdiel Farah. She has orders for her labs in and these need to be completed before additional refills.  Terry Caldera

## 2020-06-03 ENCOUNTER — VIRTUAL VISIT (OUTPATIENT)
Dept: FAMILY MEDICINE CLINIC | Age: 85
End: 2020-06-03

## 2020-06-03 DIAGNOSIS — R73.03 PREDIABETES: Primary | ICD-10-CM

## 2020-06-03 DIAGNOSIS — I10 ESSENTIAL HYPERTENSION: ICD-10-CM

## 2020-06-03 DIAGNOSIS — E03.9 HYPOTHYROIDISM, UNSPECIFIED TYPE: ICD-10-CM

## 2020-06-03 DIAGNOSIS — E78.2 MIXED HYPERLIPIDEMIA: ICD-10-CM

## 2020-06-03 RX ORDER — AMLODIPINE BESYLATE 10 MG/1
10 TABLET ORAL DAILY
Qty: 30 TAB | Refills: 0 | Status: SHIPPED | OUTPATIENT
Start: 2020-06-03 | End: 2020-07-01

## 2020-06-03 RX ORDER — LEVOTHYROXINE SODIUM 100 UG/1
TABLET ORAL
Qty: 30 TAB | Refills: 1 | Status: SHIPPED | OUTPATIENT
Start: 2020-06-03 | End: 2020-09-21

## 2020-06-03 RX ORDER — LISINOPRIL 40 MG/1
40 TABLET ORAL DAILY
Qty: 30 TAB | Refills: 0 | Status: SHIPPED | OUTPATIENT
Start: 2020-06-03 | End: 2020-07-01

## 2020-06-03 NOTE — PROGRESS NOTES
Molina Conn presents today for   Chief Complaint   Patient presents with    Hypertension    Hypothyroidism       Is someone accompanying this pt? no    Is the patient using any DME equipment during OV? no    Depression Screening:  3 most recent PHQ Screens 6/3/2020   Little interest or pleasure in doing things Not at all   Feeling down, depressed, irritable, or hopeless Not at all   Total Score PHQ 2 0       Learning Assessment:  Learning Assessment 6/3/2020   PRIMARY LEARNER Patient   HIGHEST LEVEL OF EDUCATION - PRIMARY LEARNER  GRADUATED HIGH SCHOOL OR GED   BARRIERS PRIMARY LEARNER NONE   CO-LEARNER CAREGIVER No   PRIMARY LANGUAGE ENGLISH    NEED No   LEARNER PREFERENCE PRIMARY DEMONSTRATION   ANSWERED BY patient   RELATIONSHIP SELF       Abuse Screening:  Abuse Screening Questionnaire 6/3/2020   Do you ever feel afraid of your partner? N   Are you in a relationship with someone who physically or mentally threatens you? N   Is it safe for you to go home? Y       Fall Screening  Fall Risk Assessment, last 12 mths 6/3/2020   Able to walk? Yes   Fall in past 12 months? No       Generalized Anxiety  No flowsheet data found. Health Maintenance Due   Topic Date Due    Shingrix Vaccine Age 49> (1 of 2) 01/28/1983    Pneumococcal 65+ years (1 of 1 - PPSV23) 01/28/1998    GLAUCOMA SCREENING Q2Y  05/21/2018    Medicare Yearly Exam  08/02/2018   . Health Maintenance reviewed and discussed and ordered per Provider. Coordination of Care  1. Have you been to the ER, urgent care clinic since your last visit? Hospitalized since your last visit? no    2. Have you seen or consulted any other health care providers outside of the [address] [place] [address] since your last visit? Include any pap smears or colon screening. no      Advance Directive:  1. Do you have an advance directive in place?  Patient Reply:no

## 2020-06-03 NOTE — PROGRESS NOTES
Irina Ricketts is a 80 y.o. female evaluated via audio only technology on 6/3/2020. Consent: She and/or her health care decision maker is aware that she may receive a bill for this audio only encounter, depending on her insurance coverage, and has provided verbal consent to proceed: Yes    I communicated with the patient and/or health care decision maker about the nature and details of the following:  Assessment & Plan:   Diagnoses and all orders for this visit:    1. Prediabetes  -     METABOLIC PANEL, COMPREHENSIVE; Future  -     CBC WITH AUTOMATED DIFF; Future  -     TSH 3RD GENERATION; Future  -     HEMOGLOBIN A1C WITH EAG; Future    2. Essential hypertension  -     amLODIPine (NORVASC) 10 mg tablet; Take 1 Tab by mouth daily. Indications: high blood pressure  -     lisinopriL (PRINIVIL, ZESTRIL) 40 mg tablet; Take 1 Tab by mouth daily.  -     METABOLIC PANEL, COMPREHENSIVE; Future  -     LIPID PANEL; Future  -     CBC WITH AUTOMATED DIFF; Future  -     TSH 3RD GENERATION; Future    3. Hypothyroidism, unspecified type  -     levothyroxine (SYNTHROID) 100 mcg tablet; Take 1 tablet by mouth once daily  -     METABOLIC PANEL, COMPREHENSIVE; Future  -     LIPID PANEL; Future  -     CBC WITH AUTOMATED DIFF; Future  -     TSH 3RD GENERATION; Future  -     T4, FREE; Future    4. Mixed hyperlipidemia  -     METABOLIC PANEL, COMPREHENSIVE; Future  -     LIPID PANEL; Future  -     CBC WITH AUTOMATED DIFF; Future  -     TSH 3RD GENERATION; Future  -     HEMOGLOBIN A1C WITH EAG; Future    I have stressed to patient the importance of having her labs completed. Staff will mail out her lab requisition I will give her a 30-day supply of her medication until her labs are completed. We will follow-up in 1 week for her Medicare Wellness and lab results.      12  Subjective:   Irina Ricketts is a 80 y.o. female who was seen for Hypertension and Hypothyroidism    Reports a little swelling in her ankles but otherwise she is fine. She denies any chest pain, palpitations, shortness of breath, fevers, nausea, or vomiting. She reports she just needs her medication refilled. She reports she did not have a slip to go to the lab. Prior to Admission medications    Medication Sig Start Date End Date Taking? Authorizing Provider   amLODIPine (NORVASC) 10 mg tablet Take 1 Tab by mouth daily. Indications: high blood pressure 6/3/20  Yes Mathew RICKS NP   levothyroxine (SYNTHROID) 100 mcg tablet Take 1 tablet by mouth once daily 6/3/20  Yes Ana Moe NP   lisinopriL (PRINIVIL, ZESTRIL) 40 mg tablet Take 1 Tab by mouth daily. 6/3/20  Yes Mahtew RICKS NP   aspirin delayed-release 81 mg tablet Take  by mouth daily. Yes Provider, Historical   diclofenac (VOLTAREN) 1 % gel Apply 4 g to affected area four (4) times daily. 7/17/18  Yes Mathew RICKS NP   acetaminophen (TYLENOL) 650 mg CR tablet Take 650 mg by mouth every six (6) hours as needed for Pain. Yes Provider, Historical   timolol (TIMOPTIC-XR) 0.5 % ophthalmic gel-forming Administer 1 Drop to right eye daily. Yes Provider, Historical   travoprost (TRAVATAN) 0.004 % ophthalmic solution Administer 1 Drop to right eye every evening. Yes Provider, Historical   DOCOSAHEXANOIC ACID/EPA (FISH OIL PO) Take  by mouth two (2) times a day. 7/29/10  Yes Provider, Historical   prednisoLONE acetate (PRED FORTE) 1 % ophthalmic suspension Administer 1 Drop to left eye three (3) times daily. 7/29/10  Yes Provider, Historical   levothyroxine (SYNTHROID) 100 mcg tablet Take 1 tablet by mouth once daily 4/7/20 6/3/20  Mathew RICKS NP   amLODIPine (NORVASC) 10 mg tablet Take 1 Tab by mouth daily. Indications: high blood pressure 11/19/19 6/3/20  Mathew RICKS NP   lisinopril (PRINIVIL, ZESTRIL) 40 mg tablet Take 1 Tab by mouth daily.  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    Chronic edema R60.9    Prediabetes R73.03    ACP (advance care planning) Z71.89    Stage 3 chronic kidney disease (HCC) N18.3     Patient Active Problem List    Diagnosis Date Noted    Stage 3 chronic kidney disease ([facility] [address] 12/13/2018    ACP (advance care planning) 03/22/2016    Prediabetes 03/15/2013    Arthritis of knee 02/17/2012    Arthritis of back 02/17/2012    Chronic edema 02/17/2012    Hyperlipidemia 07/30/2010    Hypertension 07/30/2010    Hypothyroidism 07/30/2010     Current Outpatient Medications   Medication Sig Dispense Refill    amLODIPine (NORVASC) 10 mg tablet Take 1 Tab by mouth daily. Indications: high blood pressure 30 Tab 0    levothyroxine (SYNTHROID) 100 mcg tablet Take 1 tablet by mouth once daily 30 Tab 1    lisinopriL (PRINIVIL, ZESTRIL) 40 mg tablet Take 1 Tab by mouth daily. 30 Tab 0    aspirin delayed-release 81 mg tablet Take  by mouth daily.  diclofenac (VOLTAREN) 1 % gel Apply 4 g to affected area four (4) times daily. 100 g 0    acetaminophen (TYLENOL) 650 mg CR tablet Take 650 mg by mouth every six (6) hours as needed for Pain.  timolol (TIMOPTIC-XR) 0.5 % ophthalmic gel-forming Administer 1 Drop to right eye daily.  travoprost (TRAVATAN) 0.004 % ophthalmic solution Administer 1 Drop to right eye every evening.  DOCOSAHEXANOIC ACID/EPA (FISH OIL PO) Take  by mouth two (2) times a day.  prednisoLONE acetate (PRED FORTE) 1 % ophthalmic suspension Administer 1 Drop to left eye three (3) times daily.        Allergies   Allergen Reactions    Lipitor [Atorvastatin] Other (comments)     Leg aches      Zocor [Simvastatin] Other (comments)     Leg aches       Past Medical History:   Diagnosis Date    Arthritis of knee 2/17/2012    CAD (coronary artery disease)     Glaucoma     HTN (hypertension)     Hyperlipidemia     Hypertensive cardiovascular disease     Hypothyroidism     Osteopenia     Prediabetes 3/15/2013     No past surgical history on file. No family history on file. Review of Systems   Constitutional: Negative for fever. Eyes: Negative for blurred vision. Respiratory: Negative for shortness of breath. Cardiovascular: Positive for leg swelling. Negative for chest pain and palpitations. Gastrointestinal: Negative for abdominal pain, blood in stool, nausea and vomiting. Genitourinary: Negative. Musculoskeletal: Negative for falls, joint pain and myalgias. Neurological: Negative for dizziness, tingling, seizures, loss of consciousness and weakness. Psychiatric/Behavioral: Negative for depression, substance abuse and suicidal ideas. I affirm this is a Patient-Initiated Episode with a Patient who has not had a related appointment within my department in the past 7 days or scheduled within the next 24 hours.      Total Time: minutes: 5-10 minutes    Note: not billable if this call serves to triage the patient into an appointment for the relevant concern      Inga Buckner NP

## 2020-06-10 LAB
A-G RATIO,AGRAT: 1.5 RATIO (ref 1.1–2.6)
ABSOLUTE LYMPHOCYTE COUNT, 10803: 1.4 K/UL (ref 1–4.8)
ALBUMIN SERPL-MCNC: 4.1 G/DL (ref 3.5–5)
ALP SERPL-CCNC: 88 U/L (ref 40–120)
ALT SERPL-CCNC: 6 U/L (ref 5–40)
ANION GAP SERPL CALC-SCNC: 7.7 MMOL/L
AST SERPL W P-5'-P-CCNC: 14 U/L (ref 10–37)
AVG GLU, 10930: 114 MG/DL (ref 91–123)
BASOPHILS # BLD: 0 K/UL (ref 0–0.2)
BASOPHILS NFR BLD: 0 % (ref 0–2)
BILIRUB SERPL-MCNC: 0.4 MG/DL (ref 0.2–1.2)
BUN SERPL-MCNC: 36 MG/DL (ref 6–22)
CALCIUM SERPL-MCNC: 9.5 MG/DL (ref 8.4–10.5)
CHLORIDE SERPL-SCNC: 109 MMOL/L (ref 98–110)
CHOLEST SERPL-MCNC: 243 MG/DL (ref 110–200)
CO2 SERPL-SCNC: 25 MMOL/L (ref 20–32)
CREAT SERPL-MCNC: 1.3 MG/DL (ref 0.8–1.4)
EOSINOPHIL # BLD: 0.1 K/UL (ref 0–0.5)
EOSINOPHIL NFR BLD: 2 % (ref 0–6)
ERYTHROCYTE [DISTWIDTH] IN BLOOD BY AUTOMATED COUNT: 14.5 % (ref 10–15.5)
GFRAA, 66117: 45.3
GFRNA, 66118: 37.4
GLOBULIN,GLOB: 2.7 G/DL (ref 2–4)
GLUCOSE SERPL-MCNC: 103 MG/DL (ref 70–99)
GRANULOCYTES,GRANS: 73 % (ref 40–75)
HBA1C MFR BLD HPLC: 5.6 % (ref 4.8–5.6)
HCT VFR BLD AUTO: 37.6 % (ref 35.1–48.3)
HDLC SERPL-MCNC: 4.1 MG/DL (ref 0–5)
HDLC SERPL-MCNC: 60 MG/DL
HGB BLD-MCNC: 11.9 G/DL (ref 11.7–16.1)
LDL/HDL RATIO,LDHD: 2.7
LDLC SERPL CALC-MCNC: 164 MG/DL (ref 50–99)
LYMPHOCYTES, LYMLT: 19 % (ref 20–45)
MCH RBC QN AUTO: 28 PG (ref 26–34)
MCHC RBC AUTO-ENTMCNC: 32 G/DL (ref 31–36)
MCV RBC AUTO: 87 FL (ref 81–99)
MONOCYTES # BLD: 0.4 K/UL (ref 0.1–1)
MONOCYTES NFR BLD: 6 % (ref 3–12)
NEUTROPHILS # BLD AUTO: 5.4 K/UL (ref 1.8–7.7)
NON-HDL CHOLESTEROL, 011976: 183 MG/DL
PLATELET # BLD AUTO: 274 K/UL (ref 140–440)
PMV BLD AUTO: 10.8 FL (ref 9–13)
POTASSIUM SERPL-SCNC: 5 MMOL/L (ref 3.5–5.5)
PROT SERPL-MCNC: 6.8 G/DL (ref 6.2–8.1)
RBC # BLD AUTO: 4.31 M/UL (ref 3.8–5.2)
SODIUM SERPL-SCNC: 142 MMOL/L (ref 133–145)
T4 FREE SERPL-MCNC: 1.3 NG/DL (ref 0.9–1.8)
TRIGL SERPL-MCNC: 96 MG/DL (ref 40–149)
TSH SERPL DL<=0.005 MIU/L-ACNC: 5.66 MCU/ML (ref 0.27–4.2)
VLDLC SERPL CALC-MCNC: 19 MG/DL (ref 8–30)
WBC # BLD AUTO: 7.4 K/UL (ref 4–11)

## 2020-06-11 ENCOUNTER — VIRTUAL VISIT (OUTPATIENT)
Dept: FAMILY MEDICINE CLINIC | Age: 85
End: 2020-06-11

## 2020-06-11 DIAGNOSIS — Z71.89 ADVANCE CARE PLANNING: ICD-10-CM

## 2020-06-11 DIAGNOSIS — Z00.00 MEDICARE ANNUAL WELLNESS VISIT, SUBSEQUENT: Primary | ICD-10-CM

## 2020-06-11 DIAGNOSIS — Z71.2 ENCOUNTER TO DISCUSS TEST RESULTS: ICD-10-CM

## 2020-06-11 DIAGNOSIS — E03.9 ACQUIRED HYPOTHYROIDISM: ICD-10-CM

## 2020-06-11 DIAGNOSIS — N18.30 STAGE 3 CHRONIC KIDNEY DISEASE (HCC): ICD-10-CM

## 2020-06-11 NOTE — PATIENT INSTRUCTIONS
Medicare Wellness Visit, Female The best way to live healthy is to have a lifestyle where you eat a well-balanced diet, exercise regularly, limit alcohol use, and quit all forms of tobacco/nicotine, if applicable. Regular preventive services are another way to keep healthy. Preventive services (vaccines, screening tests, monitoring & exams) can help personalize your care plan, which helps you manage your own care. Screening tests can find health problems at the earliest stages, when they are easiest to treat. [facility] follows the current, evidence-based guidelines published by the [facility] ([facility]) when recommending preventive services for our patients. Because we follow these guidelines, sometimes recommendations change over time as research supports it. (For example, mammograms used to be recommended annually. Even though Medicare will still pay for an annual mammogram, the newer guidelines recommend a mammogram every two years for women of average risk). Of course, you and your doctor may decide to screen more often for some diseases, based on your risk and your co-morbidities (chronic disease you are already diagnosed with). Preventive services for you include: - Medicare offers their members a free annual wellness visit, which is time for you and your primary care provider to discuss and plan for your preventive service needs. Take advantage of this benefit every year! 
-All adults over the age of 72 should receive the recommended pneumonia vaccines. Current USPSTF guidelines recommend a series of two vaccines for the best pneumonia protection.  
-All adults should have a flu vaccine yearly and a tetanus vaccine every 10 years.  
-All adults age 48 and older should receive the shingles vaccines (series of two vaccines). -All adults age 38-68 who are overweight should have a diabetes screening test once every three years. -All adults born between [birth year] and 1965 should be screened once for Hepatitis C. 
-Other screening tests and preventive services for persons with diabetes include: an eye exam to screen for diabetic retinopathy, a kidney function test, a foot exam, and stricter control over your cholesterol.  
-Cardiovascular screening for adults with routine risk involves an electrocardiogram (ECG) at intervals determined by your doctor.  
-Colorectal cancer screenings should be done for adults age 54-65 with no increased risk factors for colorectal cancer. There are a number of acceptable methods of screening for this type of cancer. Each test has its own benefits and drawbacks. Discuss with your doctor what is most appropriate for you during your annual wellness visit. The different tests include: colonoscopy (considered the best screening method), a fecal occult blood test, a fecal DNA test, and sigmoidoscopy. 
 
-A bone mass density test is recommended when a woman turns 65 to screen for osteoporosis. This test is only recommended one time, as a screening. Some providers will use this same test as a disease monitoring tool if you already have osteoporosis. -Breast cancer screenings are recommended every other year for women of normal risk, age 54-69. 
-Cervical cancer screenings for women over age 72 are only recommended with certain risk factors. Here is a list of your current Health Maintenance items (your personalized list of preventive services) with a due date: 
Health Maintenance Due Topic Date Due  Shingles Vaccine (1 of 2) 01/28/1983  Pneumococcal Vaccine (1 of 1 - PPSV23) 01/28/1998  Glaucoma Screening   05/21/2018 [address] [place] [address] Annual Well Visit  08/02/2018

## 2020-06-11 NOTE — PROGRESS NOTES
This is the Subsequent Medicare Annual Wellness Exam, performed 12 months or more after the Initial AWV or the last Subsequent AWV    Consent: Shruthi Hernandez, who was seen by synchronous (real-time) audio only technology, and/or her healthcare decision maker, is aware that this patient-initiated, Telehealth encounter on 6/11/2020 is a billable service. While AWVs are fully covered by Medicare, any services rendered on this date that are not included in an AWV are subject to additional billing, with coverage as determined by her insurance carrier. She is aware that she may receive a bill for any such additional services and has provided verbal consent to proceed: Yes. I have reviewed the patient's medical history in detail and updated the computerized patient record. 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 Current Outpatient Medications   Medication Sig Dispense Refill    amLODIPine (NORVASC) 10 mg tablet Take 1 Tab by mouth daily. Indications: high blood pressure 30 Tab 0    levothyroxine (SYNTHROID) 100 mcg tablet Take 1 tablet by mouth once daily 30 Tab 1    lisinopriL (PRINIVIL, ZESTRIL) 40 mg tablet Take 1 Tab by mouth daily. 30 Tab 0    aspirin delayed-release 81 mg tablet Take  by mouth daily.  diclofenac (VOLTAREN) 1 % gel Apply 4 g to affected area four (4) times daily.  100 g 0    acetaminophen (TYLENOL) 650 mg CR tablet Take 650 mg by mouth every six (6) hours as needed for Pain.  timolol (TIMOPTIC-XR) 0.5 % ophthalmic gel-forming Administer 1 Drop to right eye daily.  travoprost (TRAVATAN) 0.004 % ophthalmic solution Administer 1 Drop to right eye every evening.  DOCOSAHEXANOIC ACID/EPA (FISH OIL PO) Take  by mouth two (2) times a day.  prednisoLONE acetate (PRED FORTE) 1 % ophthalmic suspension Administer 1 Drop to left eye three (3) times daily. Allergies   Allergen Reactions    Lipitor [Atorvastatin] Other (comments)     Leg aches      Zocor [Simvastatin] Other (comments)     Leg aches         No family history on file. Social History     Tobacco Use    Smoking status: Never Smoker    Smokeless tobacco: Never Used   Substance Use Topics    Alcohol use: No       Depression Risk Factor Screening:     3 most recent PHQ Screens 6/3/2020   Little interest or pleasure in doing things Not at all   Feeling down, depressed, irritable, or hopeless Not at all   Total Score PHQ 2 0       Alcohol Risk Factor Screening:   Do you average 1 drink per night or more than 7 drinks a week:  No    On any one occasion in the past three months have you have had more than 3 drinks containing alcohol:  No      Functional Ability and Level of Safety:   Hearing: Hearing is good. Activities of Daily Living: The home contains: no safety equipment. Patient does total self care    Ambulation: with no difficulty    Fall Risk:  Fall Risk Assessment, last 12 mths 6/3/2020   Able to walk? Yes   Fall in past 12 months?  No       Abuse Screen:  Patient is not abused    Cognitive Screening   Has your family/caregiver stated any concerns about your memory: no  Cognitive Screening: Normal - Animal Naming Test, Recall     Patient Care Team   Patient Care Team:  Marlene Leal NP as PCP - General (Nurse Practitioner)  Marlene Leal NP as PCP - REHABILITATION [facility] Empaneled Provider    Assessment/Plan   Education and counseling provided:  Are appropriate based on today's review and evaluation  End-of-Life planning (with patient's consent)  Pneumococcal Vaccine  Influenza Vaccine  Screening Mammography  Screening Pap and pelvic (covered once every 2 years)  Colorectal cancer screening tests  Cardiovascular screening blood test  Bone mass measurement (DEXA)  Screening for glaucoma  Diabetes screening test    Diagnoses and all orders for this visit:    1. Medicare annual wellness visit, subsequent    2. Advance care planning        Health Maintenance Due   Topic Date Due    Shingrix Vaccine Age 49> (1 of 2) 01/28/1983    Pneumococcal 65+ years (1 of 1 - PPSV23) 01/28/1998    GLAUCOMA SCREENING Q2Y  05/21/2018    Medicare Yearly Exam  08/02/2018       Shell Henson is a 80 y.o. female who was evaluated by an audio only encounter for concerns as above. Patient identification was verified prior to start of the visit. A caregiver was present when appropriate. Due to this being a TeleHealth encounter (During GCGAX-74 public health emergency), evaluation of the following organ systems was limited: Vitals/Constitutional/EENT/Resp/CV/GI//MS/Neuro/Skin/Heme-Lymph-Imm. Pursuant to the emergency declaration under the [facility] waiver authority and the [employer] General Act, this Virtual Visit was conducted, with patient's (and/or legal guardian's) consent, to reduce the patient's risk of exposure to COVID-19 and provide necessary medical care. Services were provided through a synchronous discussion virtually to substitute for in-person clinic visit. I was in the office. The patient was at home.       Epifanio Mora NP

## 2020-06-11 NOTE — PROGRESS NOTES
Shruthi Hernandez is a 80 y.o. female evaluated via audio only technology on 6/11/2020. Consent: She and/or her health care decision maker is aware that she may receive a bill for this audio only encounter, depending on her insurance coverage, and has provided verbal consent to proceed: Yes    I communicated with the patient and/or health care decision maker about the nature and details of the following:  Assessment & Plan:   Diagnoses and all orders for this visit:    1. Medicare annual wellness visit, subsequent    2. Advance care planning    3. Encounter to discuss test results    4. Stage 3 chronic kidney disease ([facility] [address]    5. Acquired hypothyroidism      I have discussed with patient that her kidney disease is worsening and that I would like for her to see an nephrologist.  She is in agreement with this. I have also discussed with her her thyroid lab and I have requested that she repeat this lab. I have instructed her in how to take her thyroid medications and stressed the importance of taking them in the morning 30 minutes prior to any meal on an empty stomach. She verbalizes understanding. We have also discussed her cholesterol levels and while I do know that the thyroid can impact her cholesterol, her cholesterol has been elevated and in the same range for period of time even when her thyroid levels were within range. I have discussed with her a statin medication and she declines any medications for her cholesterol. 12  Subjective:   Shruthi Hernandez is a 80 y.o. female who was seen for Annual Wellness Visit (Medicare)      Patient reports she has been for her labs and she would like her lab results. She denies any chest pain, shortness of breath, fevers, nausea, vomiting, and or abdominal pain. She denies any recent falls or injuries. She reports she takes her thyroid medications daily. She reports she was fasting when she went for her labs.     Prior to Admission medications    Medication Sig Start Date End Date Taking? Authorizing Provider   amLODIPine (NORVASC) 10 mg tablet Take 1 Tab by mouth daily. Indications: high blood pressure 6/3/20  Yes Kimmy RICKS NP   levothyroxine (SYNTHROID) 100 mcg tablet Take 1 tablet by mouth once daily 6/3/20  Yes Ana Moe NP   lisinopriL (PRINIVIL, ZESTRIL) 40 mg tablet Take 1 Tab by mouth daily. 6/3/20  Yes Kimmy RICKS NP   aspirin delayed-release 81 mg tablet Take  by mouth daily. Yes Provider, Historical   diclofenac (VOLTAREN) 1 % gel Apply 4 g to affected area four (4) times daily. 7/17/18  Yes Kimmy RICKS NP   acetaminophen (TYLENOL) 650 mg CR tablet Take 650 mg by mouth every six (6) hours as needed for Pain. Yes Provider, Historical   timolol (TIMOPTIC-XR) 0.5 % ophthalmic gel-forming Administer 1 Drop to right eye daily. Yes Provider, Historical   travoprost (TRAVATAN) 0.004 % ophthalmic solution Administer 1 Drop to right eye every evening. Yes Provider, Historical   DOCOSAHEXANOIC ACID/EPA (FISH OIL PO) Take  by mouth two (2) times a day. 7/29/10  Yes Provider, Historical   prednisoLONE acetate (PRED FORTE) 1 % ophthalmic suspension Administer 1 Drop to left eye three (3) times daily.  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    Chronic edema R60.9    Prediabetes R73.03    ACP (advance care planning) Z71.89    Stage 3 chronic kidney disease ([facility] [address] N18.3     Patient Active Problem List    Diagnosis Date Noted    Stage 3 chronic kidney disease ([facility] [address] 12/13/2018    ACP (advance care planning) 03/22/2016    Prediabetes 03/15/2013    Arthritis of knee 02/17/2012    Arthritis of back 02/17/2012    Chronic edema 02/17/2012    Hyperlipidemia 07/30/2010    Hypertension 07/30/2010    Hypothyroidism 07/30/2010     Current Outpatient Medications   Medication Sig Dispense Refill    amLODIPine (NORVASC) 10 mg tablet Take 1 Tab by mouth daily. Indications: high blood pressure 30 Tab 0    levothyroxine (SYNTHROID) 100 mcg tablet Take 1 tablet by mouth once daily 30 Tab 1    lisinopriL (PRINIVIL, ZESTRIL) 40 mg tablet Take 1 Tab by mouth daily. 30 Tab 0    aspirin delayed-release 81 mg tablet Take  by mouth daily.  diclofenac (VOLTAREN) 1 % gel Apply 4 g to affected area four (4) times daily. 100 g 0    acetaminophen (TYLENOL) 650 mg CR tablet Take 650 mg by mouth every six (6) hours as needed for Pain.  timolol (TIMOPTIC-XR) 0.5 % ophthalmic gel-forming Administer 1 Drop to right eye daily.  travoprost (TRAVATAN) 0.004 % ophthalmic solution Administer 1 Drop to right eye every evening.  DOCOSAHEXANOIC ACID/EPA (FISH OIL PO) Take  by mouth two (2) times a day.  prednisoLONE acetate (PRED FORTE) 1 % ophthalmic suspension Administer 1 Drop to left eye three (3) times daily. Allergies   Allergen Reactions    Lipitor [Atorvastatin] Other (comments)     Leg aches      Zocor [Simvastatin] Other (comments)     Leg aches       Past Medical History:   Diagnosis Date    Arthritis of knee 2/17/2012    CAD (coronary artery disease)     Glaucoma     HTN (hypertension)     Hyperlipidemia     Hypertensive cardiovascular disease     Hypothyroidism     Osteopenia     Prediabetes 3/15/2013     No past surgical history on file. No family history on file. Social History     Tobacco Use    Smoking status: Never Smoker    Smokeless tobacco: Never Used   Substance Use Topics    Alcohol use: No       Review of Systems   Constitutional: Negative for fever. Respiratory: Negative for shortness of breath. Cardiovascular: Negative for chest pain.    Gastrointestinal: Negative for abdominal pain, nausea and vomiting. Musculoskeletal: Negative for falls. Psychiatric/Behavioral: Negative for depression and suicidal ideas. I affirm this is a Patient-Initiated Episode with a Patient who has not had a related appointment within my department in the past 7 days or scheduled within the next 24 hours.     Total Time: minutes: 5-10 minutes    Note: not billable if this call serves to triage the patient into an appointment for the relevant concern      Yvonne Monroe NP

## 2020-06-11 NOTE — PROGRESS NOTES
Advance Care Planning       Advance Care Planning (ACP) Physician/NP/PA (Provider) Conversation        Date of ACP Conversation: 6/11/2020    Conversation Conducted with:   Patient with Decision Making Noreen Juarez Maker:    Current Designated Health Care Decision Maker:   (If there is a valid [place] named in the [address]" box in the ACP activity, but it is not visible above, be sure to open that field and then select the health care decision maker relationship (ie \"primary\") in the blank space to the right of the name.)    Note: Assess and validate information in current ACP documents, as indicated. If no Authorized Decision Maker has previously been identified, then patient chooses [place]:  \"Who would you like to name as your primary health care decision-maker? \"    Name: Sariah Duenas Relationship: Son Phone number: [phone]  [employer] this person be reached easily? \" YES  \"Who would you like to name as your back-up decision maker? \"   Name: No one else per patient  Relationship:  Phone number:  \"Can this person be reached easily? \"     Note: If the relationship of these Decision-Makers to the patient does NOT follow your state's Next of Kin hierarchy, recommend that patient complete ACP document that meets state-specific requirements to allow them to act on the patient's behalf when appropriate. Care Preferences:    Hospitalization: \"If your health worsens and it becomes clear that your chance of recovery is unlikely, what would your preference be regarding hospitalization? \"  If the patient would want hospitalization, answer \"yes\". If the patient would prefer comfort-focused treatment without hospitalization, answer \"no\". yes      Ventilation:   \"If you were in your present state of health and suddenly became very ill and were unable to breathe on your own, what would your preference be about the use of a ventilator (breathing machine) if it was available to you? \"    If patient would desire the use of a ventilator (breathing machine), answer \"yes\", if not answer \"no\":yes    \"If your health worsens and it becomes clear that your chance of recovery is unlikely, what would your preference be about the use of a ventilator (breathing machine) if it was available to you? \"   yes      Resuscitation:  \"CPR works best to restart the heart when there is a sudden event, like a heart attack, in someone who is otherwise healthy. Unfortunately, CPR does not typically restart the heart for people who have serious health conditions or who are very sick. \"    \"In the event your heart stopped as a result of an underlying serious health condition, would you want attempts to be made to restart your heart (answer \"yes\" for attempt to resuscitate) or would you prefer a natural death (answer \"no\" for do not attempt to resuscitate)? \"   Patient us unsure about CPR. She states she understands what CPR is but she us unsure if she wants this or not. NOTE: If the patient has a valid advance directive AND provides care preference(s) that are inconsistent with that prior directive, advise the patient to consider either: creating a new advance directive that complies with state-specific requirements; or, if that is not possible, orally revoking that prior directive in accordance with state-specific requirements, which must be documented in the EHR. Conversation Outcomes / Follow-Up Plan:   Recommended completion of Advance Directive  She does not have an advance directive and she is not intrested in having one completed. She verbalizes understanding of what an Advance Directive is. She states she is not an organ donor and she does not wish to be one. I have explained the [facility] DL and I have asked patient to check hers to ensure organ donation is not marked. We have also dicussed the importance of her sharing her wishes with her son.  She denies having any Confucianism or cultural beliefs regarding her health care or death or dying. Patient declines additional information regarding Advance Care Planning.      Length of Voluntary ACP Conversation in minutes:  16 minutes      Jesusita Gomez NP

## 2020-06-11 NOTE — PROGRESS NOTES
Tony Hollis presents today for   Chief Complaint   Patient presents with   [address] Annual Wellness Visit     Medicare       Is someone accompanying this pt? no    Is the patient using any DME equipment during [address]? no    Depression Screening:  3 most recent PHQ Screens 6/3/2020   Little interest or pleasure in doing things Not at all   Feeling down, depressed, irritable, or hopeless Not at all   Total Score PHQ 2 0       Learning Assessment:  Learning Assessment 6/3/2020   PRIMARY LEARNER Patient   HIGHEST LEVEL OF EDUCATION - PRIMARY LEARNER  GRADUATED HIGH SCHOOL OR GED   BARRIERS PRIMARY LEARNER NONE   CO-LEARNER CAREGIVER No   PRIMARY LANGUAGE ENGLISH    NEED No   LEARNER PREFERENCE PRIMARY DEMONSTRATION   ANSWERED BY patient   RELATIONSHIP SELF       Abuse Screening:  Abuse Screening Questionnaire 6/3/2020   Do you ever feel afraid of your partner? N   Are you in a relationship with someone who physically or mentally threatens you? N   Is it safe for you to go home? Y       Fall Screening  Fall Risk Assessment, last 12 mths 6/3/2020   Able to walk? Yes   Fall in past 12 months? No       Generalized Anxiety  No flowsheet data found. Health Maintenance Due   Topic Date Due    Shingrix Vaccine Age 49> (1 of 2) 01/28/1983    Pneumococcal 65+ years (1 of 1 - PPSV23) 01/28/1998    GLAUCOMA SCREENING Q2Y  05/21/2018    Medicare Yearly Exam  08/02/2018   . Health Maintenance reviewed and discussed and ordered per Provider. Coordination of Care  1. Have you been to the ER, urgent care clinic since your last visit? Hospitalized since your last visit? no    2. Have you seen or consulted any other health care providers outside of the [address] [place] [address] since your last visit? Include any pap smears or colon screening.  no

## 2020-06-17 DIAGNOSIS — R73.03 PREDIABETES: ICD-10-CM

## 2020-06-17 DIAGNOSIS — I10 ESSENTIAL HYPERTENSION: ICD-10-CM

## 2020-06-17 DIAGNOSIS — E03.9 HYPOTHYROIDISM, UNSPECIFIED TYPE: ICD-10-CM

## 2020-06-17 DIAGNOSIS — E78.2 MIXED HYPERLIPIDEMIA: ICD-10-CM

## 2020-06-30 DIAGNOSIS — I10 ESSENTIAL HYPERTENSION: ICD-10-CM

## 2020-07-01 DIAGNOSIS — I10 ESSENTIAL HYPERTENSION: ICD-10-CM

## 2020-07-01 RX ORDER — AMLODIPINE BESYLATE 10 MG/1
TABLET ORAL
Qty: 30 TAB | Refills: 0 | Status: SHIPPED | OUTPATIENT
Start: 2020-07-01 | End: 2020-07-28

## 2020-07-01 RX ORDER — LISINOPRIL 40 MG/1
TABLET ORAL
Qty: 30 TAB | Refills: 0 | Status: SHIPPED | OUTPATIENT
Start: 2020-07-01 | End: 2020-07-28

## 2020-07-02 RX ORDER — LISINOPRIL 40 MG/1
40 TABLET ORAL DAILY
Qty: 90 TAB | Refills: 1 | OUTPATIENT
Start: 2020-07-02

## 2020-07-02 RX ORDER — AMLODIPINE BESYLATE 10 MG/1
10 TABLET ORAL DAILY
Qty: 90 TAB | Refills: 1 | OUTPATIENT
Start: 2020-07-02

## 2020-09-08 DIAGNOSIS — I10 ESSENTIAL HYPERTENSION: ICD-10-CM

## 2020-09-08 RX ORDER — LISINOPRIL 40 MG/1
TABLET ORAL
Qty: 90 TAB | Refills: 0 | Status: SHIPPED | OUTPATIENT
Start: 2020-09-08 | End: 2020-12-28

## 2020-09-21 DIAGNOSIS — E03.9 HYPOTHYROIDISM, UNSPECIFIED TYPE: ICD-10-CM

## 2020-09-21 RX ORDER — LEVOTHYROXINE SODIUM 100 UG/1
TABLET ORAL
Qty: 30 TAB | Refills: 4 | Status: SHIPPED | OUTPATIENT
Start: 2020-09-21 | End: 2020-11-09 | Stop reason: ALTCHOICE

## 2020-09-21 NOTE — TELEPHONE ENCOUNTER
Please call patient and ask her to go for her labs. I did refill her medications but she has to go for her labs.  Terry Caldera

## 2020-09-29 NOTE — TELEPHONE ENCOUNTER
Informed patient that she needed to have lab done, will mail order to her to take to [facility]

## 2020-10-14 LAB
T4 FREE SERPL-MCNC: 1.4 NG/DL (ref 0.9–1.8)
TSH SERPL DL<=0.005 MIU/L-ACNC: 4.57 MCU/ML (ref 0.27–4.2)

## 2020-10-21 NOTE — PROGRESS NOTES
Please call patient and inform her that her thyroid lab remains elevated and I would like her to make an appointment so we can discuss this result further. Please also inform her that I would like her to have a flu shot and a pneumonia shot and she can get these from her pharmacy if she is willing to have these.  SERVANDO Noble, FNP-C

## 2020-11-09 ENCOUNTER — VIRTUAL VISIT (OUTPATIENT)
Dept: FAMILY MEDICINE CLINIC | Age: 85
End: 2020-11-09
Payer: MEDICARE

## 2020-11-09 DIAGNOSIS — Z71.89 COUNSELING ON HEALTH PROMOTION AND DISEASE PREVENTION: ICD-10-CM

## 2020-11-09 DIAGNOSIS — I10 ESSENTIAL HYPERTENSION: ICD-10-CM

## 2020-11-09 DIAGNOSIS — E03.9 HYPOTHYROIDISM, UNSPECIFIED TYPE: Primary | ICD-10-CM

## 2020-11-09 PROCEDURE — 99442 PR PHYS/QHP TELEPHONE EVALUATION 11-20 MIN: CPT | Performed by: NURSE PRACTITIONER

## 2020-11-09 RX ORDER — LEVOTHYROXINE SODIUM 100 UG/1
TABLET ORAL
Qty: 90 TAB | Refills: 1 | Status: CANCELLED | OUTPATIENT
Start: 2020-11-09

## 2020-11-09 RX ORDER — LEVOTHYROXINE SODIUM 112 UG/1
112 TABLET ORAL
Qty: 30 TAB | Refills: 1 | Status: SHIPPED | OUTPATIENT
Start: 2020-11-09 | End: 2021-02-19 | Stop reason: SDUPTHER

## 2020-11-09 NOTE — PROGRESS NOTES
Henri Graham presents today for   Chief Complaint   Patient presents with    Hypothyroidism    Results     discuss lab results       Is someone accompanying this pt? no    Is the patient using any DME equipment during OV? no    Depression Screening:  3 most recent PHQ Screens 6/3/2020   Little interest or pleasure in doing things Not at all   Feeling down, depressed, irritable, or hopeless Not at all   Total Score PHQ 2 0       Learning Assessment:  Learning Assessment 6/3/2020   PRIMARY LEARNER Patient   HIGHEST LEVEL OF EDUCATION - PRIMARY LEARNER  GRADUATED HIGH SCHOOL OR GED   BARRIERS PRIMARY LEARNER NONE   CO-LEARNER CAREGIVER No   PRIMARY LANGUAGE ENGLISH    NEED No   LEARNER PREFERENCE PRIMARY DEMONSTRATION   ANSWERED BY patient   RELATIONSHIP SELF       Abuse Screening:  Abuse Screening Questionnaire 6/3/2020   Do you ever feel afraid of your partner? N   Are you in a relationship with someone who physically or mentally threatens you? N   Is it safe for you to go home? Y       Fall Screening  Fall Risk Assessment, last 12 mths 6/3/2020   Able to walk? Yes   Fall in past 12 months? No       Generalized Anxiety  No flowsheet data found. Health Maintenance Due   Topic Date Due    Shingrix Vaccine Age 49> (1 of 2) 01/28/1983    Pneumococcal 65+ years (1 of 1 - PPSV23) 01/28/1998    GLAUCOMA SCREENING Q2Y  05/21/2018    Flu Vaccine (1) 09/01/2020   . Health Maintenance reviewed and discussed and ordered per Provider. Coordination of Care  1. Have you been to the ER, urgent care clinic since your last visit? Hospitalized since your last visit? no    2. Have you seen or consulted any other health care providers outside of the [address] [place] [address] since your last visit? Include any pap smears or colon screening.  no      Advance Directive:  Discussed 6/3/20

## 2020-11-09 NOTE — PROGRESS NOTES
Dina Luong is a 80 y.o. female, evaluated via audio-only technology on 11/9/2020 for Hypothyroidism and Results (discuss lab results)    She reports she is taking her medication daily for her thyroid. She reports taking her medications on an empty stomach in the morning. She denies any fatigue. Reports she had an eye exam a few weeks ago. She reports she has to return in 6 months. She states she had a pneumonia vaccine along time ago. She declines the shingles vaccine. Assessment & Plan:   Diagnoses and all orders for this visit:    1. Hypothyroidism, unspecified type  -     levothyroxine (Euthyrox) 112 mcg tablet; Take 1 Tab by mouth Daily (before breakfast). -     TSH 3RD GENERATION; Future  -     T4, FREE; Future    2. Essential hypertension    3. Counseling on health promotion and disease prevention        I have discussed the new dosage of patient's thyroid medication with her. I have asked her to go for follow-up labs. I have also discussed side effects with her and alarm symptoms along with when to reach out if she starts to have them. I have recommended that she get her pneumonia vaccine today when she picks up her medication from her local pharmacy I have educated her on why she needs the shingles and the flu vaccine and she has refused these both. 12  Subjective:       Prior to Admission medications    Medication Sig Start Date End Date Taking? Authorizing Provider   levothyroxine (Euthyrox) 112 mcg tablet Take 1 Tab by mouth Daily (before breakfast).  11/9/20  Yes Isabel RICKS NP   lisinopriL (PRINIVIL, ZESTRIL) 40 mg tablet Take 1 tablet by mouth once daily 9/8/20  Yes Isabel RICKS NP   DurezoL 0.05 % ophthalmic emulsion INSTILL 1 DROP INTO LEFT EYE TWICE DAILY 6/27/20  Yes Provider, Historical   Combigan 0.2-0.5 % drop ophthalmic solution INSTILL 1 DROP INTO RIGHT EYE TWICE DAILY 8/12/20  Yes Provider, Historical   mirabegron ER (Myrbetriq) 25 mg ER tablet Take 1 Tab by mouth daily. 8/20/20  Yes Kris Banuelos NP   amLODIPine (NORVASC) 10 mg tablet TAKE 1 TABLET BY MOUTH ONCE DAILY FOR  HIGH  BLOOD  PRESSURE  (MEDICATION  CHANGE) 7/28/20  Yes Shin RICKS NP   aspirin delayed-release 81 mg tablet Take  by mouth daily. Yes Provider, Historical   diclofenac (VOLTAREN) 1 % gel Apply 4 g to affected area four (4) times daily. 7/17/18  Yes Shin RICKS NP   acetaminophen (TYLENOL) 650 mg CR tablet Take 650 mg by mouth every six (6) hours as needed for Pain. Yes Provider, Historical   timolol (TIMOPTIC-XR) 0.5 % ophthalmic gel-forming Administer 1 Drop to right eye daily. Yes Provider, Historical   travoprost (TRAVATAN) 0.004 % ophthalmic solution Administer 1 Drop to right eye every evening. Yes Provider, Historical   DOCOSAHEXANOIC ACID/EPA (FISH OIL PO) Take  by mouth two (2) times a day. 7/29/10  Yes Provider, Historical   prednisoLONE acetate (PRED FORTE) 1 % ophthalmic suspension Administer 1 Drop to left eye three (3) times daily. 7/29/10  Yes Provider, Historical   Euthyrox 100 mcg tablet Take 1 tablet by mouth once daily 9/21/20 11/9/20  Shin RICKS NP   amoxicillin-clavulanate (AUGMENTIN) 500-125 mg per tablet Take 1 Tab by mouth two (2) times a day.  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    Chronic edema R60.9    Prediabetes R73.03    ACP (advance care planning) Z71.89    Stage 3 chronic kidney disease N18.30     Patient Active Problem List    Diagnosis Date Noted    Stage 3 chronic kidney disease 12/13/2018    ACP (advance care planning) 03/22/2016    Prediabetes 03/15/2013    Arthritis of knee 02/17/2012    Arthritis of back 02/17/2012    Chronic edema 02/17/2012    Hyperlipidemia 07/30/2010    Hypertension 07/30/2010    Hypothyroidism 07/30/2010     Current Outpatient Medications   Medication Sig Dispense Refill    levothyroxine (Euthyrox) 112 mcg tablet Take 1 Tab by mouth Daily (before breakfast). 30 Tab 1    lisinopriL (PRINIVIL, ZESTRIL) 40 mg tablet Take 1 tablet by mouth once daily 90 Tab 0    DurezoL 0.05 % ophthalmic emulsion INSTILL 1 DROP INTO LEFT EYE TWICE DAILY      Combigan 0.2-0.5 % drop ophthalmic solution INSTILL 1 DROP INTO RIGHT EYE TWICE DAILY      mirabegron ER (Myrbetriq) 25 mg ER tablet Take 1 Tab by mouth daily. 90 Tab 3    amLODIPine (NORVASC) 10 mg tablet TAKE 1 TABLET BY MOUTH ONCE DAILY FOR  HIGH  BLOOD  PRESSURE  (MEDICATION  CHANGE) 30 Tab 3    aspirin delayed-release 81 mg tablet Take  by mouth daily.  diclofenac (VOLTAREN) 1 % gel Apply 4 g to affected area four (4) times daily. 100 g 0    acetaminophen (TYLENOL) 650 mg CR tablet Take 650 mg by mouth every six (6) hours as needed for Pain.  timolol (TIMOPTIC-XR) 0.5 % ophthalmic gel-forming Administer 1 Drop to right eye daily.  travoprost (TRAVATAN) 0.004 % ophthalmic solution Administer 1 Drop to right eye every evening.  DOCOSAHEXANOIC ACID/EPA (FISH OIL PO) Take  by mouth two (2) times a day.  prednisoLONE acetate (PRED FORTE) 1 % ophthalmic suspension Administer 1 Drop to left eye three (3) times daily. Allergies   Allergen Reactions    Lipitor [Atorvastatin] Other (comments)     Leg aches      Zocor [Simvastatin] Other (comments)     Leg aches       Past Medical History:   Diagnosis Date    Arthritis of knee 2/17/2012    CAD (coronary artery disease)     Glaucoma     HTN (hypertension)     Hyperlipidemia     Hypertensive cardiovascular disease     Hypothyroidism     Osteopenia     Prediabetes 3/15/2013     History reviewed. No pertinent surgical history. History reviewed. No pertinent family history.   Social History     Tobacco Use    Smoking status: Never Smoker    Smokeless tobacco: Never Used   Substance Use Topics    Alcohol use: No       Review of Systems   Constitutional: Negative for fever and malaise/fatigue. Respiratory: Negative for shortness of breath. Cardiovascular: Negative for chest pain and leg swelling. Gastrointestinal: Negative for abdominal pain, nausea and vomiting. Neurological: Negative for dizziness. No flowsheet data found. Tamar Mayer, who was evaluated through a patient-initiated, synchronous (real-time) audio only encounter, and/or her healthcare decision maker, is aware that it is a billable service, with coverage as determined by her insurance carrier. She provided verbal consent to proceed: Yes. She has not had a related appointment within my department in the past 7 days or scheduled within the next 24 hours.       Total Time: minutes: 11-20 minutes    Jessica Smart NP

## 2021-02-19 DIAGNOSIS — E03.9 HYPOTHYROIDISM, UNSPECIFIED TYPE: ICD-10-CM

## 2021-02-19 RX ORDER — LEVOTHYROXINE SODIUM 112 UG/1
112 TABLET ORAL
Qty: 30 TAB | Refills: 0 | Status: SHIPPED | OUTPATIENT
Start: 2021-02-19 | End: 2021-03-09 | Stop reason: SDUPTHER

## 2021-03-09 ENCOUNTER — VIRTUAL VISIT (OUTPATIENT)
Dept: FAMILY MEDICINE CLINIC | Age: 86
End: 2021-03-09
Payer: MEDICARE

## 2021-03-09 DIAGNOSIS — Z71.89 COUNSELING ON HEALTH PROMOTION AND DISEASE PREVENTION: ICD-10-CM

## 2021-03-09 DIAGNOSIS — I10 ESSENTIAL HYPERTENSION: Primary | ICD-10-CM

## 2021-03-09 DIAGNOSIS — E78.2 MIXED HYPERLIPIDEMIA: ICD-10-CM

## 2021-03-09 DIAGNOSIS — N18.30 STAGE 3 CHRONIC KIDNEY DISEASE, UNSPECIFIED WHETHER STAGE 3A OR 3B CKD (HCC): ICD-10-CM

## 2021-03-09 DIAGNOSIS — E03.9 HYPOTHYROIDISM, UNSPECIFIED TYPE: ICD-10-CM

## 2021-03-09 PROCEDURE — 99442 PR PHYS/QHP TELEPHONE EVALUATION 11-20 MIN: CPT | Performed by: NURSE PRACTITIONER

## 2021-03-09 RX ORDER — AMLODIPINE BESYLATE 10 MG/1
TABLET ORAL
Qty: 90 TAB | Refills: 1 | Status: SHIPPED | OUTPATIENT
Start: 2021-03-09 | End: 2021-03-09

## 2021-03-09 RX ORDER — LEVOTHYROXINE SODIUM 112 UG/1
112 TABLET ORAL
Qty: 30 TAB | Refills: 0 | Status: SHIPPED | OUTPATIENT
Start: 2021-03-09 | End: 2021-04-13 | Stop reason: SDUPTHER

## 2021-03-09 RX ORDER — AMLODIPINE BESYLATE 10 MG/1
TABLET ORAL
Qty: 30 TAB | Refills: 0 | Status: SHIPPED | OUTPATIENT
Start: 2021-03-09 | End: 2021-05-03

## 2021-03-09 RX ORDER — LEVOTHYROXINE SODIUM 112 UG/1
112 TABLET ORAL
Qty: 90 TAB | Refills: 1 | Status: SHIPPED | OUTPATIENT
Start: 2021-03-09 | End: 2021-03-09

## 2021-03-09 NOTE — PROGRESS NOTES
Tony Hollis is a 80 y.o. female, evaluated via audio-only technology on 3/9/2021 for Hypothyroidism and Hypertension  Hypertension - She has not checked her blood pressure. She reports taking her blood pressure medications as prescribed. Denies any chest pain, shortness of breath, or swelling in her lower extremities. She denies any dizziness and or blurred vision. Thyroid- She is taking medications as prescribed. Denies any concerns. Requesting medication refill. Eye appointment in April. She declines COVID vaccine. Assessment & Plan:   Diagnoses and all orders for this visit:    1. Essential hypertension  -     METABOLIC PANEL, COMPREHENSIVE; Future  -     CBC WITH AUTOMATED DIFF; Future  -     amLODIPine (NORVASC) 10 mg tablet; TAKE 1 TABLET BY MOUTH ONCE DAILY FOR  HIGH  BLOOD  PRESSURE    2. Hypothyroidism, unspecified type  -     METABOLIC PANEL, COMPREHENSIVE; Future  -     TSH 3RD GENERATION; Future  -     T4, FREE; Future  -     levothyroxine (Euthyrox) 112 mcg tablet; Take 1 Tab by mouth Daily (before breakfast). 3. Counseling on health promotion and disease prevention    4. Stage 3 chronic kidney disease, unspecified whether stage 3a or 3b CKD    5. Mixed hyperlipidemia  -     METABOLIC PANEL, COMPREHENSIVE; Future  -     LIPID PANEL; Future  -     CBC WITH AUTOMATED DIFF; Future    Labs completed prior to visit. Next appointment in the office on April 13th at 9:30AM.  I have discussed the importance of patient having her labs completed at her follow-up in the office so we can check her blood pressure. Care gaps reviewed. Patient declines Covid and shingles vaccine. She has been educated. I have asked her to continue to mask, social distancing have good hand hygiene with Covid. She does agree to having the pneumonia vaccine. 12  Subjective:       Prior to Admission medications    Medication Sig Start Date End Date Taking?  Authorizing Provider   amLODIPine (NORVASC) 10 mg tablet TAKE 1 TABLET BY MOUTH ONCE DAILY FOR  HIGH  BLOOD  PRESSURE 3/9/21  Yes Arsh Oseiide B, NP   levothyroxine (Euthyrox) 112 mcg tablet Take 1 Tab by mouth Daily (before breakfast). 3/9/21  Yes Arsh Snide B, NP   lisinopriL (PRINIVIL, ZESTRIL) 40 mg tablet Take 1 tablet by mouth once daily 12/28/20  Yes Arsh RICKS NP   DurezoL 0.05 % ophthalmic emulsion INSTILL 1 DROP INTO LEFT EYE TWICE DAILY 6/27/20  Yes Provider, Historical   Combigan 0.2-0.5 % drop ophthalmic solution INSTILL 1 DROP INTO RIGHT EYE TWICE DAILY 8/12/20  Yes Provider, Historical   mirabegron ER (Myrbetriq) 25 mg ER tablet Take 1 Tab by mouth daily. 8/20/20  Yes Jarad Hernandez NP   aspirin delayed-release 81 mg tablet Take  by mouth daily. Yes Provider, Historical   diclofenac (VOLTAREN) 1 % gel Apply 4 g to affected area four (4) times daily. 7/17/18  Yes Arsh RICKS NP   acetaminophen (TYLENOL) 650 mg CR tablet Take 650 mg by mouth every six (6) hours as needed for Pain. Yes Provider, Historical   timolol (TIMOPTIC-XR) 0.5 % ophthalmic gel-forming Administer 1 Drop to right eye daily. Yes Provider, Historical   travoprost (TRAVATAN) 0.004 % ophthalmic solution Administer 1 Drop to right eye every evening. Yes Provider, Historical   DOCOSAHEXANOIC ACID/EPA (FISH OIL PO) Take  by mouth two (2) times a day. 7/29/10  Yes Provider, Historical   prednisoLONE acetate (PRED FORTE) 1 % ophthalmic suspension Administer 1 Drop to left eye three (3) times daily. 7/29/10  Yes Provider, Historical   amLODIPine (NORVASC) 10 mg tablet TAKE 1 TABLET BY MOUTH ONCE DAILY FOR  HIGH  BLOOD  PRESSURE 3/9/21 3/9/21  Arsh Oseiide B, NP   levothyroxine (Euthyrox) 112 mcg tablet Take 1 Tab by mouth Daily (before breakfast). 3/9/21 3/9/21  Gaila Snide B, NP   levothyroxine (Euthyrox) 112 mcg tablet Take 1 Tab by mouth Daily (before breakfast).  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    Chronic edema R60.9    Prediabetes R73.03    ACP (advance care planning) Z71.89    Stage 3 chronic kidney disease N18.30     Patient Active Problem List    Diagnosis Date Noted    Stage 3 chronic kidney disease 12/13/2018    ACP (advance care planning) 03/22/2016    Prediabetes 03/15/2013    Arthritis of knee 02/17/2012    Arthritis of back 02/17/2012    Chronic edema 02/17/2012    Hyperlipidemia 07/30/2010    Hypertension 07/30/2010    Hypothyroidism 07/30/2010     Current Outpatient Medications   Medication Sig Dispense Refill    amLODIPine (NORVASC) 10 mg tablet TAKE 1 TABLET BY MOUTH ONCE DAILY FOR  HIGH  BLOOD  PRESSURE 30 Tab 0    levothyroxine (Euthyrox) 112 mcg tablet Take 1 Tab by mouth Daily (before breakfast). 30 Tab 0    lisinopriL (PRINIVIL, ZESTRIL) 40 mg tablet Take 1 tablet by mouth once daily 90 Tab 1    DurezoL 0.05 % ophthalmic emulsion INSTILL 1 DROP INTO LEFT EYE TWICE DAILY      Combigan 0.2-0.5 % drop ophthalmic solution INSTILL 1 DROP INTO RIGHT EYE TWICE DAILY      mirabegron ER (Myrbetriq) 25 mg ER tablet Take 1 Tab by mouth daily. 90 Tab 3    aspirin delayed-release 81 mg tablet Take  by mouth daily.  diclofenac (VOLTAREN) 1 % gel Apply 4 g to affected area four (4) times daily. 100 g 0    acetaminophen (TYLENOL) 650 mg CR tablet Take 650 mg by mouth every six (6) hours as needed for Pain.  timolol (TIMOPTIC-XR) 0.5 % ophthalmic gel-forming Administer 1 Drop to right eye daily.  travoprost (TRAVATAN) 0.004 % ophthalmic solution Administer 1 Drop to right eye every evening.  DOCOSAHEXANOIC ACID/EPA (FISH OIL PO) Take  by mouth two (2) times a day.       prednisoLONE acetate (PRED FORTE) 1 % ophthalmic suspension Administer 1 Drop to left eye three (3) times daily. Allergies   Allergen Reactions    Lipitor [Atorvastatin] Other (comments)     Leg aches      Zocor [Simvastatin] Other (comments)     Leg aches       Past Medical History:   Diagnosis Date    Arthritis of knee 2/17/2012    CAD (coronary artery disease)     Glaucoma     HTN (hypertension)     Hyperlipidemia     Hypertensive cardiovascular disease     Hypothyroidism     Osteopenia     Prediabetes 3/15/2013     History reviewed. No pertinent surgical history. History reviewed. No pertinent family history. Social History     Tobacco Use    Smoking status: Never Smoker    Smokeless tobacco: Never Used   Substance Use Topics    Alcohol use: No       Review of Systems   Constitutional: Negative for fever. HENT: Negative for congestion. Eyes: Negative for blurred vision. Respiratory: Negative for cough and shortness of breath. Cardiovascular: Negative for chest pain and leg swelling. Gastrointestinal: Negative for abdominal pain, blood in stool, constipation, diarrhea, nausea and vomiting. Genitourinary: Negative. Musculoskeletal: Negative for falls. Neurological: Negative for dizziness and weakness. Psychiatric/Behavioral: Negative for depression, substance abuse and suicidal ideas. No flowsheet data found. Total time 11 minutes. Kiran Park, who was evaluated through a patient-initiated, synchronous (real-time) audio only encounter, and/or her healthcare decision maker, is aware that it is a billable service, with coverage as determined by her insurance carrier. She provided verbal consent to proceed: Yes. She has not had a related appointment within my department in the past 7 days or scheduled within the next 24 hours.       Total Time: minutes: 11-20 minutes    Jesusita Gomez NP

## 2021-03-09 NOTE — PROGRESS NOTES
Chief Complaint   Patient presents with    Hypothyroidism    Hypertension         Is someone accompanying this pt? no    Is the patient using any DME equipment during OV? no    Depression Screening:  3 most recent PHQ Screens 3/9/2021   Little interest or pleasure in doing things Not at all   Feeling down, depressed, irritable, or hopeless Not at all   Total Score PHQ 2 0       Learning Assessment:  Learning Assessment 3/9/2021   PRIMARY LEARNER Patient   HIGHEST LEVEL OF EDUCATION - PRIMARY LEARNER  GRADUATED HIGH SCHOOL OR GED   BARRIERS PRIMARY LEARNER NONE   CO-LEARNER CAREGIVER No   PRIMARY LANGUAGE ENGLISH    NEED No   LEARNER PREFERENCE PRIMARY DEMONSTRATION   ANSWERED BY patient   RELATIONSHIP SELF       Abuse Screening:  Abuse Screening Questionnaire 3/9/2021   Do you ever feel afraid of your partner? N   Are you in a relationship with someone who physically or mentally threatens you? N   Is it safe for you to go home? Y       Fall Screening  Fall Risk Assessment, last 12 mths 3/9/2021   Able to walk? Yes   Fall in past 12 months? 0   Do you feel unsteady? 0   Are you worried about falling 0       Generalized Anxiety  No flowsheet data found. Health Maintenance Due   Topic Date Due    COVID-19 Vaccine (1 of 2) Never done    Shingrix Vaccine Age 50> (1 of 2) Never done    Pneumococcal 65+ years (1 of 1 - PPSV23) Never done    GLAUCOMA SCREENING Q2Y  05/21/2018   . Health Maintenance reviewed and discussed and ordered per Provider. Coordination of Care  1. Have you been to the ER, urgent care clinic since your last visit? Hospitalized since your last visit? no    2. Have you seen or consulted any other health care providers outside of the [address] [place] [address] since your last visit? Include any pap smears or colon screening. no      Advance Directive:  1. Do you have an advance directive in place?  Patient Reply:no

## 2021-03-24 LAB
T4 FREE SERPL-MCNC: 1.7 NG/DL (ref 0.9–1.8)
TSH SERPL DL<=0.005 MIU/L-ACNC: 6.42 MCU/ML (ref 0.27–4.2)

## 2021-04-13 ENCOUNTER — VIRTUAL VISIT (OUTPATIENT)
Dept: FAMILY MEDICINE CLINIC | Age: 86
End: 2021-04-13
Payer: MEDICARE

## 2021-04-13 DIAGNOSIS — E03.9 HYPOTHYROIDISM, UNSPECIFIED TYPE: Primary | ICD-10-CM

## 2021-04-13 DIAGNOSIS — Z71.2 ENCOUNTER TO DISCUSS TEST RESULTS: ICD-10-CM

## 2021-04-13 DIAGNOSIS — E78.2 MIXED HYPERLIPIDEMIA: ICD-10-CM

## 2021-04-13 DIAGNOSIS — I10 ESSENTIAL HYPERTENSION: ICD-10-CM

## 2021-04-13 DIAGNOSIS — R73.03 PREDIABETES: ICD-10-CM

## 2021-04-13 DIAGNOSIS — N18.30 STAGE 3 CHRONIC KIDNEY DISEASE, UNSPECIFIED WHETHER STAGE 3A OR 3B CKD (HCC): ICD-10-CM

## 2021-04-13 PROCEDURE — 99441 PR PHYS/QHP TELEPHONE EVALUATION 5-10 MIN: CPT | Performed by: NURSE PRACTITIONER

## 2021-04-13 RX ORDER — LEVOTHYROXINE SODIUM 112 UG/1
112 TABLET ORAL
Qty: 90 TAB | Refills: 0 | Status: SHIPPED | OUTPATIENT
Start: 2021-04-13

## 2021-04-13 NOTE — PROGRESS NOTES
Bina Zaman is a 80 y.o. female, evaluated via audio-only technology on 4/13/2021 for Results (discuss lab results)  She reports she is out of her thyroid medications. She denies any chest pain, shortness of breath, nausea, or vomiting. She reports she was out of her medication when she went for her labs. Assessment & Plan:   Diagnoses and all orders for this visit:    1. Hypothyroidism, unspecified type  -     levothyroxine (Euthyrox) 112 mcg tablet; Take 1 Tab by mouth Daily (before breakfast). -     TSH 3RD GENERATION; Future  -     T4, FREE; Future  -     HEMOGLOBIN A1C WITH EAG; Future    2. Encounter to discuss test results    3. Essential hypertension  -     METABOLIC PANEL, COMPREHENSIVE; Future  -     LIPID PANEL; Future  -     CBC WITH AUTOMATED DIFF; Future  -     HEMOGLOBIN A1C WITH EAG; Future    4. Stage 3 chronic kidney disease, unspecified whether stage 3a or 3b CKD (HCC)  -     METABOLIC PANEL, COMPREHENSIVE; Future  -     LIPID PANEL; Future  -     CBC WITH AUTOMATED DIFF; Future  -     HEMOGLOBIN A1C WITH EAG; Future    5. Mixed hyperlipidemia  -     METABOLIC PANEL, COMPREHENSIVE; Future  -     LIPID PANEL; Future  -     CBC WITH AUTOMATED DIFF; Future  -     HEMOGLOBIN A1C WITH EAG; Future    6. Prediabetes  -     HEMOGLOBIN A1C WITH EAG; Future      She is to restart her medication and follow up in the office on July 6th at 9:30AM. She is to have her labs prior to her visit and she is aware of this. 12  Subjective:       Prior to Admission medications    Medication Sig Start Date End Date Taking? Authorizing Provider   amLODIPine (NORVASC) 10 mg tablet TAKE 1 TABLET BY MOUTH ONCE DAILY FOR  HIGH  BLOOD  PRESSURE 3/9/21  Yes Ivelisse RICKS NP   levothyroxine (Euthyrox) 112 mcg tablet Take 1 Tab by mouth Daily (before breakfast).  3/9/21  Yes Ivelisse RICKS NP   lisinopriL (PRINIVIL, ZESTRIL) 40 mg tablet Take 1 tablet by mouth once daily 12/28/20  Yes Dagoberto Elizabeth NP DurezoL 0.05 % ophthalmic emulsion INSTILL 1 DROP INTO LEFT EYE TWICE DAILY 6/27/20  Yes Provider, Historical   Combigan 0.2-0.5 % drop ophthalmic solution INSTILL 1 DROP INTO RIGHT EYE TWICE DAILY 8/12/20  Yes Provider, Historical   mirabegron ER (Myrbetriq) 25 mg ER tablet Take 1 Tab by mouth daily. 8/20/20  Yes Amberly Burns, RAO   aspirin delayed-release 81 mg tablet Take  by mouth daily. Yes Provider, Historical   diclofenac (VOLTAREN) 1 % gel Apply 4 g to affected area four (4) times daily. 7/17/18  Yes Eamon RICKS NP   acetaminophen (TYLENOL) 650 mg CR tablet Take 650 mg by mouth every six (6) hours as needed for Pain. Yes Provider, Historical   timolol (TIMOPTIC-XR) 0.5 % ophthalmic gel-forming Administer 1 Drop to right eye daily. Yes Provider, Historical   travoprost (TRAVATAN) 0.004 % ophthalmic solution Administer 1 Drop to right eye every evening. Yes Provider, Historical   DOCOSAHEXANOIC ACID/EPA (FISH OIL PO) Take  by mouth two (2) times a day. 7/29/10  Yes Provider, Historical   prednisoLONE acetate (PRED FORTE) 1 % ophthalmic suspension Administer 1 Drop to left eye three (3) times daily.  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    Chronic edema R60.9    Prediabetes R73.03    ACP (advance care planning) Z71.89    Stage 3 chronic kidney disease ([facility] [address] N18.30     Patient Active Problem List    Diagnosis Date Noted    Stage 3 chronic kidney disease ([facility] 12/13/2018    ACP (advance care planning) 03/22/2016    Prediabetes 03/15/2013    Arthritis of knee 02/17/2012    Arthritis of back 02/17/2012    Chronic edema 02/17/2012    Hyperlipidemia 07/30/2010    Hypertension 07/30/2010    Hypothyroidism 07/30/2010     Current Outpatient Medications   Medication Sig Dispense Refill    amLODIPine (NORVASC) 10 mg tablet TAKE 1 TABLET BY MOUTH ONCE DAILY FOR  HIGH  BLOOD  PRESSURE 30 Tab 0    levothyroxine (Euthyrox) 112 mcg tablet Take 1 Tab by mouth Daily (before breakfast). 30 Tab 0    lisinopriL (PRINIVIL, ZESTRIL) 40 mg tablet Take 1 tablet by mouth once daily 90 Tab 1    DurezoL 0.05 % ophthalmic emulsion INSTILL 1 DROP INTO LEFT EYE TWICE DAILY      Combigan 0.2-0.5 % drop ophthalmic solution INSTILL 1 DROP INTO RIGHT EYE TWICE DAILY      mirabegron ER (Myrbetriq) 25 mg ER tablet Take 1 Tab by mouth daily. 90 Tab 3    aspirin delayed-release 81 mg tablet Take  by mouth daily.  diclofenac (VOLTAREN) 1 % gel Apply 4 g to affected area four (4) times daily. 100 g 0    acetaminophen (TYLENOL) 650 mg CR tablet Take 650 mg by mouth every six (6) hours as needed for Pain.  timolol (TIMOPTIC-XR) 0.5 % ophthalmic gel-forming Administer 1 Drop to right eye daily.  travoprost (TRAVATAN) 0.004 % ophthalmic solution Administer 1 Drop to right eye every evening.  DOCOSAHEXANOIC ACID/EPA (FISH OIL PO) Take  by mouth two (2) times a day.  prednisoLONE acetate (PRED FORTE) 1 % ophthalmic suspension Administer 1 Drop to left eye three (3) times daily. Allergies   Allergen Reactions    Lipitor [Atorvastatin] Other (comments)     Leg aches      Zocor [Simvastatin] Other (comments)     Leg aches       Past Medical History:   Diagnosis Date    Arthritis of knee 2/17/2012    CAD (coronary artery disease)     Glaucoma     HTN (hypertension)     Hyperlipidemia     Hypertensive cardiovascular disease     Hypothyroidism     Osteopenia     Prediabetes 3/15/2013     History reviewed. No pertinent surgical history. History reviewed. No pertinent family history. Social History     Tobacco Use    Smoking status: Never Smoker    Smokeless tobacco: Never Used   Substance Use Topics    Alcohol use: No       Review of Systems   Constitutional: Negative for fever. HENT: Negative for congestion. Eyes: Negative for blurred vision. Respiratory: Negative for cough and shortness of breath. Cardiovascular: Negative for chest pain and leg swelling. Gastrointestinal: Negative for abdominal pain, nausea and vomiting. Musculoskeletal: Negative for falls. Neurological: Negative for dizziness. No flowsheet data found. Kishor Ravi, who was evaluated through a patient-initiated, synchronous (real-time) audio only encounter, and/or her healthcare decision maker, is aware that it is a billable service, with coverage as determined by her insurance carrier. She provided verbal consent to proceed: Yes. She has not had a related appointment within my department in the past 7 days or scheduled within the next 24 hours.     Total time 7 minutes  Total Time: minutes: 5-10 minutes    Delores Nash NP

## 2021-04-13 NOTE — PROGRESS NOTES
Le Abraham presents today for   Chief Complaint   Patient presents with    Results     discuss lab results       Is someone accompanying this pt? no    Is the patient using any DME equipment during OV? no    Depression Screening:  3 most recent PHQ Screens 4/13/2021   Little interest or pleasure in doing things Not at all   Feeling down, depressed, irritable, or hopeless Not at all   Total Score PHQ 2 0       Learning Assessment:  Learning Assessment 3/9/2021   PRIMARY LEARNER Patient   HIGHEST LEVEL OF EDUCATION - PRIMARY LEARNER  GRADUATED HIGH SCHOOL OR GED   BARRIERS PRIMARY LEARNER NONE   CO-LEARNER CAREGIVER No   PRIMARY LANGUAGE ENGLISH    NEED No   LEARNER PREFERENCE PRIMARY DEMONSTRATION   ANSWERED BY patient   RELATIONSHIP SELF       Abuse Screening:  Abuse Screening Questionnaire 3/9/2021   Do you ever feel afraid of your partner? N   Are you in a relationship with someone who physically or mentally threatens you? N   Is it safe for you to go home? Y       Fall Screening  Fall Risk Assessment, last 12 mths 3/9/2021   Able to walk? Yes   Fall in past 12 months? 0   Do you feel unsteady? 0   Are you worried about falling 0       Generalized Anxiety  No flowsheet data found. Health Maintenance Due   Topic Date Due    Pneumococcal 65+ years (1 of 1 - PPSV23) Never done   . Health Maintenance reviewed and discussed and ordered per Provider. Coordination of Care  1. Have you been to the ER, urgent care clinic since your last visit? Hospitalized since your last visit? no    2. Have you seen or consulted any other health care providers outside of the [address] [place] [address] since your last visit? Include any pap smears or colon screening.  no      Advance Directive:  Discussed 3/9/21

## 2021-07-06 ENCOUNTER — APPOINTMENT (OUTPATIENT)
Dept: FAMILY MEDICINE CLINIC | Age: 86
End: 2021-07-06

## 2021-08-30 ENCOUNTER — TELEPHONE (OUTPATIENT)
Dept: FAMILY MEDICINE CLINIC | Age: 86
End: 2021-08-30

## 2021-08-30 NOTE — TELEPHONE ENCOUNTER
Spoke with Leeann RN from [facility] who is seeing patient. She reports patient has a hard time coming down the steps for an appointment. I informed Leeann that I could not just order medications for patient as I had not seen her since she left rehab and I am not clear about what medications she is taking. I am not comfortable ordering medications for patient without seeing her and this has been explained to patient. I also made Leeann aware that I was not aware that she was even being seeing by home health and needed to know who was signing off on the plan of care. Leeann RN states she was at [facility] and they sign off for 30 days post discharge. Leeann RN states patient is not receptive to moving, getting out of the house, or medical transport. Leeann states she is working on visiting physicians coming in to see patient. Leeann RN states she will need an upcoming provider to sign off on the plan of care. Patient has nursing once a week and PT/OT weekly as well. I have strongly recommend Home health assist patient with scheduling transportation for an appointment. We can have any labs completed by home health  f needed, but I recommend an appointment at this time and updated clinical information regarding her medical condition.  SERVANDO Noble, FNP-C

## 2021-08-30 NOTE — TELEPHONE ENCOUNTER
Home Health Nurse called from [facility] ([facility]) she said that this patient is having a hard time getting down the steps to come outside to be able to leave her house. She also said that the patient has swelling in her lags and she is only on 20 mg of lasix.  Please advise